# Patient Record
Sex: MALE | Race: WHITE | Employment: FULL TIME | URBAN - METROPOLITAN AREA
[De-identification: names, ages, dates, MRNs, and addresses within clinical notes are randomized per-mention and may not be internally consistent; named-entity substitution may affect disease eponyms.]

---

## 2017-08-02 ENCOUNTER — OFFICE VISIT (OUTPATIENT)
Dept: INTERNAL MEDICINE CLINIC | Age: 57
End: 2017-08-02

## 2017-08-02 VITALS
HEIGHT: 72 IN | SYSTOLIC BLOOD PRESSURE: 126 MMHG | WEIGHT: 266.4 LBS | DIASTOLIC BLOOD PRESSURE: 80 MMHG | BODY MASS INDEX: 36.08 KG/M2 | HEART RATE: 80 BPM

## 2017-08-02 DIAGNOSIS — M54.2 PAIN OF CERVICAL SPINE: ICD-10-CM
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
## 2017-08-02 PROBLEM — S16.1XXA STRAIN OF NECK MUSCLE: Status: ACTIVE | Noted: 2017-08-02

## 2017-08-02 PROBLEM — I10 ESSENTIAL HYPERTENSION: Status: ACTIVE | Noted: 2017-08-02

## 2017-08-02 PROBLEM — S53.402A SPRAIN OF LEFT ELBOW: Status: ACTIVE | Noted: 2017-08-02

## 2017-08-02 PROBLEM — S70.11XA CONTUSION OF RIGHT THIGH: Status: ACTIVE | Noted: 2017-08-02

## 2017-08-02 PROBLEM — S43.409A SHOULDER SPRAIN: Status: ACTIVE | Noted: 2017-08-02

## 2017-08-02 PROBLEM — S39.012A BACK STRAIN: Status: ACTIVE | Noted: 2017-08-02

## 2017-08-02 PROBLEM — R91.1 PULMONARY NODULE: Status: ACTIVE | Noted: 2017-08-02
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
## 2017-08-02 NOTE — PATIENT INSTRUCTIONS
Neck Pain: Care Instructions  Your Care Instructions  You can have neck pain anywhere from the bottom of your head to the top of your shoulders. It can spread to the upper back or arms. Injuries, painting a ceiling, sleeping with your neck twisted, staying in one position for too long, and many other activities can cause neck pain. Most neck pain gets better with home care. Your doctor may recommend medicine to relieve pain or relax your muscles. He or she may suggest exercise and physical therapy to increase flexibility and relieve stress. You may need to wear a special (cervical) collar to support your neck for a day or two. Follow-up care is a key part of your treatment and safety. Be sure to make and go to all appointments, and call your doctor if you are having problems. It's also a good idea to know your test results and keep a list of the medicines you take. How can you care for yourself at home? · Try using a heating pad on a low or medium setting for 15 to 20 minutes every 2 or 3 hours. Try a warm shower in place of one session with the heating pad. · You can also try an ice pack for 10 to 15 minutes every 2 to 3 hours. Put a thin cloth between the ice and your skin. · Take pain medicines exactly as directed. ¨ If the doctor gave you a prescription medicine for pain, take it as prescribed. ¨ If you are not taking a prescription pain medicine, ask your doctor if you can take an over-the-counter medicine. · If your doctor recommends a cervical collar, wear it exactly as directed. When should you call for help? Call your doctor now or seek immediate medical care if:  · You have new or worsening numbness in your arms, buttocks or legs. · You have new or worsening weakness in your arms or legs. (This could make it hard to stand up.)  · You lose control of your bladder or bowels.   Watch closely for changes in your health, and be sure to contact your doctor if:  · Your neck pain is getting worse.  · You are not getting better after 1 week. · You do not get better as expected. Where can you learn more? Go to http://aldo-beau.info/. Enter [id number] in the search box to learn more about \"Neck Pain: Care Instructions. \"  Current as of: March 21, 2017  Content Version: 11.3  © 9620-0425 [employer]. Care instructions adapted under license by [employer] (which disclaims liability or warranty for this information). If you have questions about a medical condition or this instruction, always ask your healthcare professional. [place] [address] any warranty or liability for your use of this information.

## 2017-08-02 NOTE — MR AVS SNAPSHOT
Visit Information Date & Time Provider Department Dept. Phone Encounter #  
 8/2/2017  4:00 PM Verner Auerbach, MD [facility] [id number] Upcoming Health Maintenance Date Due Hepatitis C Screening 1960 DTaP/Tdap/Td series (1 - Tdap) 5/4/1981 FOBT Q 1 YEAR AGE 50-75 5/4/2010 INFLUENZA AGE 9 TO ADULT 8/1/2017 Allergies as of 8/2/2017  Review Complete On: 8/2/2017 By: Verner Auerbach, MD  
 No Known Allergies Current Immunizations  Never Reviewed No immunizations on file. Not reviewed this visit You Were Diagnosed With   
  
 Codes Comments Pain of cervical spine     ICD-10-CM: M54.2 ICD-9-CM: 723.1 Vitals BP Pulse Height(growth percentile) Weight(growth percentile) BMI Smoking Status 126/80 80 6' (1.829 m) 266 lb 6.4 oz (120.8 kg) 36.13 kg/m2 Never Smoker Vitals History BMI and BSA Data Body Mass Index Body Surface Area  
 36.13 kg/m 2 2.48 m 2 Your Updated Medication List  
  
   
This list is accurate as of: 8/2/17  4:25 PM.  Always use your most recent med list.  
  
  
  
  
 LISINOPRIL-HYDROCHLOROTHIAZIDE PO Take  by mouth daily. To-Do List   
 08/03/2017 Imaging:  MRI CERV SPINE W WO CONT Introducing \[facility] [place] [facility]\"" SERVICES! Nica Handy introduces [employer] patient portal. Now you can access parts of your medical record, email your doctor's office, and request medication refills online. 1. In your internet browser, go to [email or web address] 2. Click on the First Time User? Click Here link in the Sign In box. You will see the New Member Sign Up page. 3. Enter your [employer] Access Code exactly as it appears below. You will not need to use this code after youve completed the sign-up process. If you do not sign up before the expiration date, you must request a new code. · [employer] Access Code: [email or web address] Expires: 10/31/2017  3:50 PM 
 
4. Enter the last four digits of your Social Security Number (xxxx) and Date of Birth (mm/dd/yyyy) as indicated and click Submit. You will be taken to the next sign-up page. 5. Create a DataProm ID. This will be your [employer] login ID and cannot be changed, so think of one that is secure and easy to remember. 6. Create a DataProm password. You can change your password at any time. 7. Enter your Password Reset Question and Answer. This can be used at a later time if you forget your password. 8. Enter your e-mail address. You will receive e-mail notification when new information is available in [address] 9. Click Sign Up. You can now view and download portions of your medical record. 10. Click the Download Summary menu link to download a portable copy of your medical information. If you have questions, please visit the Frequently Asked Questions section of the Data[employer] website. Remember, [employer] is NOT to be used for urgent needs. For medical emergencies, dial 911. Now available from your iPhone and Android! Please provide this summary of care documentation to your next provider. Your primary care clinician is listed as ANA Maldonado 21. If you have any questions after today's visit, please call [phone].
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
## 2017-08-08 ENCOUNTER — HOSPITAL ENCOUNTER (OUTPATIENT)
Dept: MRI IMAGING | Age: 57
Discharge: HOME | End: 2017-08-08
Attending: INTERNAL MEDICINE
Payer: COMMERCIAL

## 2017-08-08 DIAGNOSIS — M54.2 PAIN OF CERVICAL SPINE: ICD-10-CM

## 2017-08-08 PROCEDURE — 72141 MRI NECK SPINE W/O DYE: CPT

## 2017-08-09 NOTE — PROGRESS NOTES
Patient notified of results and advised to see Dr. Radha Rader Pt reports onset of chest pain in the center of his chest that radiates into neck, pt reports he has been taking nitro, which initially relieved the pain, and is now not helping the pain, Pt reports \"burning\" in his throat, SOB, and nausea

## 2017-11-21 ENCOUNTER — OFFICE VISIT (OUTPATIENT)
Dept: INTERNAL MEDICINE CLINIC | Age: 57
End: 2017-11-21

## 2017-11-21 VITALS
SYSTOLIC BLOOD PRESSURE: 120 MMHG | BODY MASS INDEX: 33.5 KG/M2 | WEIGHT: 261 LBS | DIASTOLIC BLOOD PRESSURE: 82 MMHG | HEART RATE: 78 BPM | OXYGEN SATURATION: 95 % | HEIGHT: 74 IN

## 2017-11-21 DIAGNOSIS — L30.9 DERMATITIS: Primary | ICD-10-CM

## 2017-11-21 NOTE — PROGRESS NOTES
Jermaine Hernandez is a 62 y.o. male presenting for Rash (? ringwrom)  . 1. Have you been to the ER, urgent care clinic since your last visit? Hospitalized since your last visit? No    2. Have you seen or consulted any other health care providers outside of the [address] [place] [address] since your last visit? Include any pap smears or colon screening. No    No flowsheet data found. No flowsheet data found. PHQ over the last two weeks 8/2/2017   Little interest or pleasure in doing things Not at all   Feeling down, depressed or hopeless Not at all   Total Score PHQ 2 0       There are no discontinued medications.

## 2017-11-21 NOTE — MR AVS SNAPSHOT
Visit Information Date & Time Provider Department Dept. Phone Encounter #  
 11/21/2017  8:50 AM Cale Harris MD [facility] [id number] Upcoming Health Maintenance Date Due Hepatitis C Screening 1960 DTaP/Tdap/Td series (1 - Tdap) 5/4/1981 FOBT Q 1 YEAR AGE 50-75 5/4/2010 Influenza Age 5 to Adult 8/1/2017 Allergies as of 11/21/2017  Review Complete On: 11/21/2017 By: Cale Harris MD  
 No Known Allergies Current Immunizations  Never Reviewed No immunizations on file. Not reviewed this visit You Were Diagnosed With   
  
 Codes Comments Dermatitis    -  Primary ICD-10-CM: L30.9 ICD-9-CM: 692.9 Vitals BP Pulse Height(growth percentile) Weight(growth percentile) SpO2 BMI  
 120/82 (BP 1 Location: Left arm, BP Patient Position: Sitting) 78 6' 2\" (1.88 m) 261 lb (118.4 kg) 95% 33.51 kg/m2 Smoking Status Never Smoker BMI and BSA Data Body Mass Index Body Surface Area  
 33.51 kg/m 2 2.49 m 2 Your Updated Medication List  
  
   
This list is accurate as of: 11/21/17  9:00 AM.  Always use your most recent med list.  
  
  
  
  
 cranberry extract 450 mg Tab tablet Take 450 mg by mouth. LISINOPRIL-HYDROCHLOROTHIAZIDE PO Take  by mouth daily. We Performed the Following REFERRAL TO DERMATOLOGY [REF19 Custom] Comments:  
 appt asap Evaluate leg rash with multiple bilateral ring lesions Referral Information Referral ID Referred By Referred To  
  
 [id number] Willie Hernadez MD   
   [address] [facility] [place] [facility] Phone: [phone] Fax: [phone] Visits Status Start Date End Date 1 New Request 11/21/17 11/21/18 If your referral has a status of pending review or denied, additional information will be sent to support the outcome of this decision. Introducing [facility]! Ashok Vanegas introduces [employer] patient portal. Now you can access parts of your medical record, email your doctor's office, and request medication refills online. 1. In your internet browser, go to [email or web address] 2. Click on the First Time User? Click Here link in the Sign In box. You will see the New Member Sign Up page. 3. Enter your [employer] Access Code exactly as it appears below. You will not need to use this code after youve completed the sign-up process. If you do not sign up before the expiration date, you must request a new code. · [employer] Access Code: [email or web address] Expires: 2/19/2018  8:37 AM 
 
4. Enter the last four digits of your Social Security Number (xxxx) and Date of Birth (mm/dd/yyyy) as indicated and click Submit. You will be taken to the next sign-up page. 5. Create a [employer] ID. This will be your [employer] login ID and cannot be changed, so think of one that is secure and easy to remember. 6. Create a [employer] password. You can change your password at any time. 7. Enter your Password Reset Question and Answer. This can be used at a later time if you forget your password. 8. Enter your e-mail address. You will receive e-mail notification when new information is available in [address] 9. Click Sign Up. You can now view and download portions of your medical record. 10. Click the Download Summary menu link to download a portable copy of your medical information. If you have questions, please visit the Frequently Asked Questions section of the [employer] website. Remember, [employer] is NOT to be used for urgent needs. For medical emergencies, dial 911. Now available from your iPhone and Android! Please provide this summary of care documentation to your next provider. Your primary care clinician is listed as ANA Dubois. If you have any questions after today's visit, please call [phone].

## 2017-11-21 NOTE — PATIENT INSTRUCTIONS
Dermatitis: Care Instructions  Your Care Instructions  Dermatitis is the general name used for any rash or inflammation of the skin. Different kinds of dermatitis cause different kinds of rashes. Common causes of a rash include new medicines, plants (such as poison oak or poison ivy), heat, and stress. Certain illnesses can also cause a rash. An allergic reaction to something that touches your skin, such as latex, nickel, or poison ivy, is called contact dermatitis. Contact dermatitis may also be caused by something that irritates the skin, such as bleach, a chemical, or soap. These types of rashes cannot be spread from person to person. How long your rash will last depends on what caused it. Rashes may last a few days or months. Follow-up care is a key part of your treatment and safety. Be sure to make and go to all appointments, and call your doctor if you are having problems. It's also a good idea to know your test results and keep a list of the medicines you take. How can you care for yourself at home? · Do not scratch the rash. Cut your nails short, and file them smooth. Or wear gloves if this helps keep you from scratching. · Wash the area with water only. Pat dry. · Put cold, wet cloths on the rash to reduce itching. · Keep cool, and stay out of the sun. · Leave the rash open to the air as much as possible. · If the rash itches, use hydrocortisone cream. Follow the directions on the label. Calamine lotion may help for plant rashes. · Take an over-the-counter antihistamine, such as diphenhydramine (Benadryl) or loratadine (Claritin), to help calm the itching. Read and follow all instructions on the label. · If your doctor prescribed a cream, use it as directed. If your doctor prescribed medicine, take it exactly as directed. When should you call for help?   Call your doctor now or seek immediate medical care if:  ? · You have symptoms of infection, such as:  ¨ Increased pain, swelling, warmth, or redness. ¨ Red streaks leading from the area. ¨ Pus draining from the area. ¨ A fever. ? · You have joint pain along with the rash. ? Watch closely for changes in your health, and be sure to contact your doctor if:  ? · Your rash is changing or getting worse. ? · You are not getting better as expected. Where can you learn more? Go to http://aldo-beau.info/. Enter [phone] in the search box to learn more about \"Dermatitis: Care Instructions. \"  Current as of: October 13, 2016  Content Version: 11.4  © 8161-8763 Appistry. Care instructions adapted under license by [employer] (which disclaims liability or warranty for this information). If you have questions about a medical condition or this instruction, always ask your healthcare professional. [address] any warranty or liability for your use of this information.

## 2018-01-04 ENCOUNTER — OFFICE VISIT (OUTPATIENT)
Dept: INTERNAL MEDICINE CLINIC | Age: 58
End: 2018-01-04

## 2018-01-04 VITALS
HEIGHT: 74 IN | TEMPERATURE: 99.3 F | WEIGHT: 260 LBS | DIASTOLIC BLOOD PRESSURE: 80 MMHG | BODY MASS INDEX: 33.37 KG/M2 | SYSTOLIC BLOOD PRESSURE: 126 MMHG

## 2018-01-04 DIAGNOSIS — J11.1 INFLUENZA: Primary | ICD-10-CM

## 2018-01-04 RX ORDER — CODEINE PHOSPHATE AND GUAIFENESIN 10; 100 MG/5ML; MG/5ML
5 SOLUTION ORAL
Qty: 118 ML | Refills: 0 | Status: SHIPPED | OUTPATIENT
Start: 2018-01-04 | End: 2019-01-08 | Stop reason: ALTCHOICE

## 2018-01-04 RX ORDER — OSELTAMIVIR PHOSPHATE 75 MG/1
75 CAPSULE ORAL 2 TIMES DAILY
Qty: 10 CAP | Refills: 0 | Status: SHIPPED | OUTPATIENT
Start: 2018-01-04 | End: 2018-01-09

## 2018-01-04 NOTE — PATIENT INSTRUCTIONS

## 2018-01-04 NOTE — PROGRESS NOTES
Ekle Cason is a 62 y.o. male presenting for Cough  . 1. Have you been to the ER, urgent care clinic since your last visit? Hospitalized since your last visit? No    2. Have you seen or consulted any other health care providers outside of the [address] [place] [address] since your last visit? Include any pap smears or colon screening. No    No flowsheet data found. No flowsheet data found. PHQ over the last two weeks 8/2/2017   Little interest or pleasure in doing things Not at all   Feeling down, depressed or hopeless Not at all   Total Score PHQ 2 0       There are no discontinued medications.

## 2018-01-04 NOTE — MR AVS SNAPSHOT
Visit Information Date & Time Provider Department Dept. Phone Encounter #  
 1/4/2018  8:50 AM Kavitha Giron MD [facility] [id number] Follow-up Instructions Return if symptoms worsen or fail to improve. Upcoming Health Maintenance Date Due Hepatitis C Screening 1960 DTaP/Tdap/Td series (1 - Tdap) 5/4/1981 FOBT Q 1 YEAR AGE 50-75 5/4/2010 Allergies as of 1/4/2018  Review Complete On: 1/4/2018 By: Kavitha Giron MD  
 No Known Allergies Current Immunizations  Never Reviewed No immunizations on file. Not reviewed this visit You Were Diagnosed With   
  
 Codes Comments Influenza    -  Primary ICD-10-CM: J11.1 ICD-9-CM: 487. 1 Vitals BP Temp Height(growth percentile) Weight(growth percentile) BMI Smoking Status 126/80 (BP 1 Location: Left arm) 99.3 °F (37.4 °C) (Oral) 6' 2\" (1.88 m) 260 lb (117.9 kg) 33.38 kg/m2 Never Smoker Vitals History BMI and BSA Data Body Mass Index Body Surface Area  
 33.38 kg/m 2 2.48 m 2 Preferred Pharmacy Pharmacy Name Phone [facility] [phone] Your Updated Medication List  
  
   
This list is accurate as of: 1/4/18  9:05 AM.  Always use your most recent med list.  
  
  
  
  
 cranberry extract 450 mg Tab tablet Take 450 mg by mouth.  
  
 guaiFENesin-codeine 100-10 mg/5 mL solution Commonly known as:  Eden Cordon Take 5 mL by mouth four (4) times daily as needed for Cough. Max Daily Amount: 20 mL. LISINOPRIL-HYDROCHLOROTHIAZIDE PO Take  by mouth daily. oseltamivir 75 mg capsule Commonly known as:  TAMIFLU Take 1 Cap by mouth two (2) times a day for 5 days. Prescriptions Printed  Refills  
 guaiFENesin-codeine (CHERATUSSIN AC) 100-10 mg/5 mL solution 0  
 Sig: Take 5 mL by mouth four (4) times daily as needed for Cough. Max Daily Amount: 20 mL. Class: Print Route: Oral  
  
Prescriptions Sent to Pharmacy Refills  
 oseltamivir (TAMIFLU) 75 mg capsule 0 Sig: Take 1 Cap by mouth two (2) times a day for 5 days. Class: Normal  
 Pharmacy: [facility]  #: [phone] Route: Oral  
  
Follow-up Instructions Return if symptoms worsen or fail to improve. Introducing [address]! Daniel Little introduces [employer] patient portal. Now you can access parts of your medical record, email your doctor's office, and request medication refills online. 1. In your internet browser, go to [email or web address] 2. Click on the First Time User? Click Here link in the Sign In box. You will see the New Member Sign Up page. 3. Enter your [employer] Access Code exactly as it appears below. You will not need to use this code after youve completed the sign-up process. If you do not sign up before the expiration date, you must request a new code. · [employer] Access Code: [email or web address] Expires: 2/19/2018  8:37 AM 
 
4. Enter the last four digits of your Social Security Number (xxxx) and Date of Birth (mm/dd/yyyy) as indicated and click Submit. You will be taken to the next sign-up page. 5. Create a [employer] ID. This will be your [employer] login ID and cannot be changed, so think of one that is secure and easy to remember. 6. Create a [employer] password. You can change your password at any time. 7. Enter your Password Reset Question and Answer. This can be used at a later time if you forget your password. 8. Enter your e-mail address. You will receive e-mail notification when new information is available in [address] 9. Click Sign Up. You can now view and download portions of your medical record.  
10. Click the Download Summary menu link to download a portable copy of your medical information. If you have questions, please visit the Frequently Asked Questions section of the [employer] website. Remember, [employer] is NOT to be used for urgent needs. For medical emergencies, dial 911. Now available from your iPhone and Android! Please provide this summary of care documentation to your next provider. Your primary care clinician is listed as ANA Dubois. If you have any questions after today's visit, please call [phone].

## 2018-01-04 NOTE — LETTER
NOTIFICATION RETURN TO WORK / SCHOOL 
 
1/4/2018 9:05 AM 
 
Mr. Von Guthrie [address] [facility] [id number] To Whom It May Concern: 
 
Von Guthrie is currently under the care of Lisa Valera. He will return to work/school on: 1-8-18. If there are questions or concerns please have the patient contact our office. Sincerely, Ray Castro MD
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
## 2018-03-09 RX ORDER — LISINOPRIL AND HYDROCHLOROTHIAZIDE 12.5; 2 MG/1; MG/1
TABLET ORAL
Qty: 30 TAB | Refills: 6 | Status: SHIPPED | OUTPATIENT
Start: 2018-03-09 | End: 2018-12-10 | Stop reason: SDUPTHER

## 2018-12-10 RX ORDER — LISINOPRIL AND HYDROCHLOROTHIAZIDE 12.5; 2 MG/1; MG/1
TABLET ORAL
Qty: 30 TAB | Refills: 0 | Status: SHIPPED | OUTPATIENT
Start: 2018-12-10 | End: 2019-01-08 | Stop reason: SDUPTHER

## 2018-12-10 NOTE — TELEPHONE ENCOUNTER
Pharmacy on file verified  Requested Prescriptions     Pending Prescriptions Disp Refills    lisinopril-hydroCHLOROthiazide (PRINZIDE, ZESTORETIC) 20-12.5 mg per tablet 30 Tab 0     Sig: TAKE 1 TABLET BY MOUTH EVERY MORNING       LOV 1/4/2018  NOV 1/8/19

## 2019-01-08 ENCOUNTER — OFFICE VISIT (OUTPATIENT)
Dept: INTERNAL MEDICINE CLINIC | Age: 59
End: 2019-01-08

## 2019-01-08 VITALS
HEIGHT: 74 IN | HEART RATE: 104 BPM | SYSTOLIC BLOOD PRESSURE: 108 MMHG | BODY MASS INDEX: 33.5 KG/M2 | WEIGHT: 261 LBS | DIASTOLIC BLOOD PRESSURE: 72 MMHG | OXYGEN SATURATION: 95 %

## 2019-01-08 DIAGNOSIS — I10 ESSENTIAL HYPERTENSION: Primary | Chronic | ICD-10-CM

## 2019-01-08 DIAGNOSIS — N20.0 KIDNEY STONES: ICD-10-CM

## 2019-01-08 LAB
BACTERIA UA POCT, BACTPOCT: NORMAL
BILIRUB UR QL STRIP: NEGATIVE
CASTS UA POCT: NORMAL
CLUE CELLS, CLUEPOCT: NEGATIVE
CRYSTALS UA POCT, CRYSPOCT: NEGATIVE
EPITHELIAL CELLS POCT: NEGATIVE
GLUCOSE UR-MCNC: NEGATIVE MG/DL
GRAN# POC: 7 K/UL (ref 2–7.8)
GRAN% POC: 72.3 % (ref 37–92)
HCT VFR BLD CALC: 46.4 % (ref 37–51)
HGB BLD-MCNC: 15.5 G/DL (ref 12–18)
KETONES P FAST UR STRIP-MCNC: NORMAL MG/DL
LY# POC: 2.3 K/UL (ref 0.6–4.1)
LY% POC: 24 % (ref 10–58.5)
MCH RBC QN: 28.4 PG (ref 26–32)
MCHC RBC-ENTMCNC: 33.4 G/DL (ref 30–36)
MCV RBC: 85 FL (ref 80–97)
MID #, POC: 0.3 K/UL (ref 0–1.8)
MID% POC: 3.7 % (ref 0.1–24)
MUCUS UA POCT, MUCPOCT: NORMAL
PH UR STRIP: 6 [PH] (ref 5–7)
PLATELET # BLD: 249 K/UL (ref 140–440)
PROT UR QL STRIP: NORMAL
RBC # BLD: 5.47 M/UL (ref 4.2–6.3)
RBC UA POCT, RBCPOCT: 0
SP GR UR STRIP: 1.01 (ref 1.01–1.02)
TRICH UA POCT, TRICHPOC: NEGATIVE
UA UROBILINOGEN AMB POC: NORMAL (ref 0.2–1)
URINALYSIS CLARITY POC: CLEAR
URINALYSIS COLOR POC: YELLOW
URINE BLOOD POC: NEGATIVE
URINE CULT COMMENT, POCT: NORMAL
URINE LEUKOCYTES POC: NEGATIVE
URINE NITRITES POC: NEGATIVE
WBC # BLD: 9.6 K/UL (ref 4.1–10.9)
WBC UA POCT, WBCPOCT: 0
YEAST UA POCT, YEASTPOC: NEGATIVE

## 2019-01-08 RX ORDER — ALLOPURINOL 100 MG/1
TABLET ORAL DAILY
COMMUNITY

## 2019-01-08 RX ORDER — POTASSIUM CITRATE 10 MEQ/1
10 TABLET, EXTENDED RELEASE ORAL 2 TIMES DAILY
COMMUNITY

## 2019-01-08 RX ORDER — LISINOPRIL AND HYDROCHLOROTHIAZIDE 12.5; 2 MG/1; MG/1
TABLET ORAL
Qty: 30 TAB | Refills: 12 | Status: SHIPPED | OUTPATIENT
Start: 2019-01-08 | End: 2019-09-04 | Stop reason: SDUPTHER

## 2019-01-08 RX ORDER — TAMSULOSIN HYDROCHLORIDE 0.4 MG/1
0.4 CAPSULE ORAL DAILY
COMMUNITY
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
## 2019-01-08 NOTE — PROGRESS NOTES
Stephany Kennedy is a 62 y.o. male presenting for Hypertension (1 year fu) Elijah Milner 1. Have you been to the ER, urgent care clinic since your last visit? Hospitalized since your last visit? No 
 
2. Have you seen or consulted any other health care providers outside of the [address] [place] [address] since your last visit? Include any pap smears or colon screening. No 
 
No flowsheet data found. No flowsheet data found. PHQ over the last two weeks 1/8/2019 Little interest or pleasure in doing things Not at all Feeling down, depressed, irritable, or hopeless Not at all Total Score PHQ 2 0 Medications Discontinued During This Encounter Medication Reason  LISINOPRIL-HYDROCHLOROTHIAZIDE PO Duplicate Order

## 2019-01-08 NOTE — PATIENT INSTRUCTIONS

## 2019-01-09 LAB
ALBUMIN SERPL-MCNC: 4.3 G/DL (ref 3.5–5.5)
ALBUMIN/GLOB SERPL: 1.7 {RATIO} (ref 1.2–2.2)
ALP SERPL-CCNC: 83 IU/L (ref 39–117)
ALT SERPL-CCNC: 25 IU/L (ref 0–44)
AST SERPL-CCNC: 22 IU/L (ref 0–40)
BILIRUB SERPL-MCNC: 0.2 MG/DL (ref 0–1.2)
BUN SERPL-MCNC: 20 MG/DL (ref 6–24)
BUN/CREAT SERPL: 19 (ref 9–20)
CALCIUM SERPL-MCNC: 9.1 MG/DL (ref 8.7–10.2)
CHLORIDE SERPL-SCNC: 103 MMOL/L (ref 96–106)
CHOLEST SERPL-MCNC: 169 MG/DL (ref 100–199)
CO2 SERPL-SCNC: 23 MMOL/L (ref 20–29)
CREAT SERPL-MCNC: 1.03 MG/DL (ref 0.76–1.27)
GLOBULIN SER CALC-MCNC: 2.5 G/DL (ref 1.5–4.5)
GLUCOSE SERPL-MCNC: 89 MG/DL (ref 65–99)
HDLC SERPL-MCNC: 30 MG/DL
LDLC SERPL CALC-MCNC: 102 MG/DL (ref 0–99)
POTASSIUM SERPL-SCNC: 4.4 MMOL/L (ref 3.5–5.2)
PROT SERPL-MCNC: 6.8 G/DL (ref 6–8.5)
SODIUM SERPL-SCNC: 143 MMOL/L (ref 134–144)
TRIGL SERPL-MCNC: 186 MG/DL (ref 0–149)
URATE SERPL-MCNC: 4 MG/DL (ref 3.7–8.6)
VLDLC SERPL CALC-MCNC: 37 MG/DL (ref 5–40)

## 2019-05-28 ENCOUNTER — TELEPHONE (OUTPATIENT)
Dept: INTERNAL MEDICINE CLINIC | Age: 59
End: 2019-05-28

## 2019-05-28 NOTE — TELEPHONE ENCOUNTER
Patient is calling, he advises that he is having back pain in his rib cage area on the right side again. He has had kidney stones in the past so he does not know if this is one again or if he has pulled something but would like to have Dr. Malcom Oliver look at it. He is requesting an appointment Thursday, any time 2:30 and after.     Best call back # for patient: [phone]

## 2019-05-30 ENCOUNTER — OFFICE VISIT (OUTPATIENT)
Dept: INTERNAL MEDICINE CLINIC | Age: 59
End: 2019-05-30

## 2019-05-30 VITALS
HEART RATE: 73 BPM | HEIGHT: 74 IN | BODY MASS INDEX: 33.93 KG/M2 | SYSTOLIC BLOOD PRESSURE: 122 MMHG | DIASTOLIC BLOOD PRESSURE: 80 MMHG | WEIGHT: 264.4 LBS | OXYGEN SATURATION: 94 % | TEMPERATURE: 98 F

## 2019-05-30 DIAGNOSIS — N39.0 URINARY TRACT INFECTION WITHOUT HEMATURIA, SITE UNSPECIFIED: ICD-10-CM

## 2019-05-30 DIAGNOSIS — R10.9 RIGHT FLANK PAIN: Primary | ICD-10-CM

## 2019-05-30 LAB
BACTERIA,BACTU: ABNORMAL
BILIRUB UR QL: NEGATIVE
CLARITY: CLEAR
COLOR UR: ABNORMAL
GLUCOSE 24H UR-MRATE: NEGATIVE G/(24.H)
HGB UR QL STRIP: ABNORMAL
KETONES UR QL STRIP.AUTO: NEGATIVE
LEUKOCYTE ESTERASE: NEGATIVE
NITRITE UR QL STRIP.AUTO: NEGATIVE
PH UR STRIP: 6 [PH] (ref 5–7)
PROT UR STRIP-MCNC: NEGATIVE MG/DL
RBC #/AREA URNS HPF: ABNORMAL #/HPF
SP GR UR REFRACTOMETRY: 1.02 (ref 1–1.03)
UROBILINOGEN UR QL STRIP.AUTO: NEGATIVE
WBC URNS QL MICRO: 0 #/HPF

## 2019-05-30 RX ORDER — DICLOFENAC SODIUM 75 MG/1
75 TABLET, DELAYED RELEASE ORAL 2 TIMES DAILY
Qty: 60 TAB | Refills: 0 | Status: SHIPPED | OUTPATIENT
Start: 2019-05-30 | End: 2019-10-08 | Stop reason: ALTCHOICE

## 2019-05-30 NOTE — PROGRESS NOTES
Prachi Cha is a 61 y.o. male presenting for Back Pain  . 1. Have you been to the ER, urgent care clinic since your last visit? Hospitalized since your last visit? No    2. Have you seen or consulted any other health care providers outside of the [address] [place] [address] since your last visit? Include any pap smears or colon screening. No    No flowsheet data found. No flowsheet data found. 3 most recent PHQ Screens 1/8/2019   Little interest or pleasure in doing things Not at all   Feeling down, depressed, irritable, or hopeless Not at all   Total Score PHQ 2 0       There are no discontinued medications.
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
## 2019-06-01 LAB — BACTERIA UR CULT: NO GROWTH

## 2019-09-04 DIAGNOSIS — I10 ESSENTIAL HYPERTENSION: Chronic | ICD-10-CM

## 2019-09-05 RX ORDER — LISINOPRIL AND HYDROCHLOROTHIAZIDE 12.5; 2 MG/1; MG/1
TABLET ORAL
Qty: 30 TAB | Refills: 0 | Status: SHIPPED | OUTPATIENT
Start: 2019-09-05 | End: 2019-11-08 | Stop reason: SDUPTHER

## 2019-10-08 ENCOUNTER — OFFICE VISIT (OUTPATIENT)
Dept: INTERNAL MEDICINE CLINIC | Age: 59
End: 2019-10-08

## 2019-10-08 VITALS
BODY MASS INDEX: 34.06 KG/M2 | HEART RATE: 80 BPM | HEIGHT: 74 IN | RESPIRATION RATE: 24 BRPM | TEMPERATURE: 98 F | OXYGEN SATURATION: 93 % | WEIGHT: 265.4 LBS | SYSTOLIC BLOOD PRESSURE: 118 MMHG | DIASTOLIC BLOOD PRESSURE: 80 MMHG

## 2019-10-08 DIAGNOSIS — M77.42 METATARSALGIA OF BOTH FEET: Primary | ICD-10-CM

## 2019-10-08 DIAGNOSIS — M77.41 METATARSALGIA OF BOTH FEET: Primary | ICD-10-CM

## 2019-10-08 NOTE — PATIENT INSTRUCTIONS
Metatarsalgia: Care Instructions Your Care Instructions Metatarsalgia (say \"met-uh-tar-SAL-augie-uh\") is pain in the ball of the foot. It sometimes spreads to the toes. The ball of the foot is the bottom of the foot, where the toes join the foot. While walking might be very painful, the pain is usually not a sign of a serious or permanent problem. But any pain can affect your life, so it is important that you treat it. Pain in this area can be caused by many things. For example, you may have this pain if you stand or walk a lot or wear tight shoes or high heels. Your pain might be caused by inflammation of a joint (capsulitis). It is most common in the joint at the base of the second toe, near the ball of the foot. Capsulitis happens when ligaments that go around the joint become inflamed. The joint may be swollen. It may feel like there is a small rock under it. You may have had an X-ray if your doctor wanted to make sure a more serious problem is not causing your pain. Treatment may consist of home care, such as rest, wearing different shoes, and taking over-the-counter pain medicines. It can take months for the pain to go away. If the ligaments around a joint are torn, or if a toe has started to slant toward the toe next to it, you may need surgery. Follow-up care is a key part of your treatment and safety. Be sure to make and go to all appointments, and call your doctor if you are having problems. It's also a good idea to know your test results and keep a list of the medicines you take. How can you care for yourself at home? · Rest your foot. If an activity is causing the pain, find another one to do that does not put so much pressure on your foot. · Put ice or a cold pack on your foot when it hurts or after you've done something that usually causes pain. Do this for 10 to 20 minutes at a time. Put a thin cloth between the ice and your skin. · Take an over-the-counter pain medicine, such as acetaminophen (Tylenol), ibuprofen (Advil, Motrin), or naproxen (Aleve). Be safe with medicines. Read and follow all instructions on the label. · Do not take two or more pain medicines at the same time unless the doctor told you to. Many pain medicines have acetaminophen, which is Tylenol. Too much acetaminophen (Tylenol) can be harmful. · Wear roomy, comfortable shoes. · If your doctor recommends it, use special pads to relieve the pressure on your foot. The pads may fit into your shoes, or they may stick to the soles of your feet. · Ask your doctor about using orthotic shoe devices. These are molded pieces of rubber, leather, metal, plastic, or other synthetic material that are inserted into a shoe. · Wear shoes with good arch support. · Try not to wear high heels or narrow shoes. · Follow your doctor's or physical therapist's directions for exercise. When should you call for help? Watch closely for changes in your health, and be sure to contact your doctor if: 
  · You have new or worse symptoms.  
  · You do not get better as expected. Where can you learn more? Go to http://aldo-beau.info/. Enter D309 in the search box to learn more about \"Metatarsalgia: Care Instructions. \" Current as of: June 26, 2019 Content Version: 12.2 © 5735-4783 [employer]. Care instructions adapted under license by [employer] (which disclaims liability or warranty for this information). If you have questions about a medical condition or this instruction, always ask your healthcare professional. [address] any warranty or liability for your use of this information. Learning About Cutting Calories How do calories affect your weight? Food gives your body energy. Energy from the food you eat is measured in calories.  This energy keeps your heart beating, your brain active, and your muscles working. Your body needs a certain number of calories each day. After your body uses the calories it needs, it stores extra calories as fat. To lose weight safely, you have to eat fewer calories while eating in a healthy way. How many calories do you need each day? The more active you are, the more calories you need. When you are less active, you need fewer calories. How many calories you need each day also depends on several things, including your age and whether you are male or female. Here are some general guidelines for adults: 
· Less active women and older adults need 1,600 to 2,000 calories each day. · Active women and less active men need 2,000 to 2,400 calories each day. · Active men need 2,400 to 3,000 calories each day. How can you cut calories and eat healthy meals? Whole grains, vegetables and fruits, and dried beans are good lower-calorie foods. They give you lots of nutrients and fiber. And they fill you up. Sweets, energy drinks, and soda pop are high in calories. They give you few nutrients and no fiber. Try to limit soda pop, fruit juice, and energy drinks. Drink water instead. Some fats can be part of a healthy diet. But cutting back on fats from highly processed foods like fast foods and many snack foods is a good way to lower the calories in your diet. Also, use smaller amounts of fats like butter, margarine, salad dressing, and mayonnaise. Add fresh garlic, lemon, or herbs to your meals to add flavor without adding fat. Meats and dairy products can be a big source of hidden fats. Try to choose lean or low-fat versions of these products. Fat-free cookies, candies, chips, and frozen treats can still be high in sugar and calories. Some fat-free foods have more calories than regular ones. Eat fat-free treats in moderation, as you would other foods.  
If your favorite foods are high in fat, salt, sugar, or calories, limit how often you eat them. Eat smaller servings, or look for healthy substitutes. Fill up on fruits, vegetables, and whole grains. Eating at home · Use meat as a side dish instead of as the main part of your meal. 
· Try main dishes that use whole wheat pasta, brown rice, dried beans, or vegetables. · Find ways to cook with little or no fat, such as broiling, steaming, or grilling. · Use cooking spray instead of oil. If you use oil, use a monounsaturated oil, such as canola or olive oil. · Trim fat from meats before you cook them. · Drain off fat after you brown the meat or while you roast it. · Chill soups and stews after you cook them. Then skim the fat off the top after it hardens. Eating out · Order foods that are broiled or poached rather than fried or breaded. · Cut back on the amount of butter or margarine that you use on bread. · Order sauces, gravies, and salad dressings on the side, and use only a little. · When you order pasta, choose tomato sauce rather than cream sauce. · Ask for salsa with your baked potato instead of sour cream, butter, cheese, or spangler. · Order meals in a small size instead of upgrading to a large. · Share an entree, or take part of your food home to eat as another meal. 
· Share appetizers and desserts. Where can you learn more? Go to http://aldo-beau.info/. Enter 99 [id number] in the search box to learn more about \"Learning About Cutting Calories. \" Current as of: November 7, 2018 Content Version: 12.2 © 3269-4772 [employer]. Care instructions adapted under license by [employer] (which disclaims liability or warranty for this information). If you have questions about a medical condition or this instruction, always ask your healthcare professional. [place] [address] any warranty or liability for your use of this information.

## 2019-10-08 NOTE — PROGRESS NOTES
Emma Krishna is a 61 y.o. male presenting for Foot Problem (both)  . 1. Have you been to the ER, urgent care clinic since your last visit? Hospitalized since your last visit? No    2. Have you seen or consulted any other health care providers outside of the [address] [place] [address] since your last visit? Include any pap smears or colon screening. No    No flowsheet data found. No flowsheet data found. 3 most recent PHQ Screens 1/8/2019   Little interest or pleasure in doing things Not at all   Feeling down, depressed, irritable, or hopeless Not at all   Total Score PHQ 2 0       There are no discontinued medications.
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
## 2019-12-09 ENCOUNTER — TELEPHONE (OUTPATIENT)
Dept: INTERNAL MEDICINE CLINIC | Age: 59
End: 2019-12-09

## 2019-12-09 ENCOUNTER — OFFICE VISIT (OUTPATIENT)
Dept: INTERNAL MEDICINE CLINIC | Age: 59
End: 2019-12-09

## 2019-12-09 VITALS
WEIGHT: 267.8 LBS | TEMPERATURE: 100.2 F | BODY MASS INDEX: 34.37 KG/M2 | HEIGHT: 74 IN | RESPIRATION RATE: 24 BRPM | HEART RATE: 96 BPM | SYSTOLIC BLOOD PRESSURE: 122 MMHG | OXYGEN SATURATION: 91 % | DIASTOLIC BLOOD PRESSURE: 72 MMHG

## 2019-12-09 DIAGNOSIS — J11.1 INFLUENZA: Primary | ICD-10-CM

## 2019-12-09 LAB
FLUAV RNA SPEC QL NAA+PROBE: POSITIVE
FLUBV RNA SPEC QL NAA+PROBE: NEGATIVE

## 2019-12-09 RX ORDER — OSELTAMIVIR PHOSPHATE 75 MG/1
75 CAPSULE ORAL 2 TIMES DAILY
Qty: 10 CAP | Refills: 0 | Status: SHIPPED | OUTPATIENT
Start: 2019-12-09 | End: 2019-12-14
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
## 2019-12-09 NOTE — PROGRESS NOTES
Irlanda Michelle is a 61 y.o. male presenting for Flu Like Symptoms  . 1. Have you been to the ER, urgent care clinic since your last visit? Hospitalized since your last visit? No    2. Have you seen or consulted any other health care providers outside of the [facility] since your last visit? Include any pap smears or colon screening. No    No flowsheet data found. No flowsheet data found. 3 most recent PHQ Screens 1/8/2019   Little interest or pleasure in doing things Not at all   Feeling down, depressed, irritable, or hopeless Not at all   Total Score PHQ 2 0       There are no discontinued medications.

## 2019-12-09 NOTE — LETTER
NOTIFICATION RETURN TO WORK / SCHOOL 
 
12/9/2019 3:11 PM 
 
Mr. Aysha Rivera [address] [facility] [address] [id number] To Whom It May Concern: 
 
Aysha Rivera is currently under the care of [facility]. He will return to work/school on: 12-16-19 If there are questions or concerns please have the patient contact our office. Sincerely, Luzma Rivera MD

## 2019-12-09 NOTE — PATIENT INSTRUCTIONS
Influenza (Flu): Care Instructions Your Care Instructions Influenza (flu) is an infection in the lungs and breathing passages. It is caused by the influenza virus. There are different strains, or types, of the flu virus from year to year. Unlike the common cold, the flu comes on suddenly and the symptoms, such as a cough, congestion, fever, chills, fatigue, aches, and pains, are more severe. These symptoms may last up to 10 days. Although the flu can make you feel very sick, it usually doesn't cause serious health problems. Home treatment is usually all you need for flu symptoms. But your doctor may prescribe antiviral medicine to prevent other health problems, such as pneumonia, from developing. Older people and those who have a long-term health condition, such as lung disease, are most at risk for having pneumonia or other health problems. Follow-up care is a key part of your treatment and safety. Be sure to make and go to all appointments, and call your doctor if you are having problems. It's also a good idea to know your test results and keep a list of the medicines you take. How can you care for yourself at home? · Get plenty of rest. 
· Drink plenty of fluids, enough so that your urine is light yellow or clear like water. If you have kidney, heart, or liver disease and have to limit fluids, talk with your doctor before you increase the amount of fluids you drink. · Take an over-the-counter pain medicine if needed, such as acetaminophen (Tylenol), ibuprofen (Advil, Motrin), or naproxen (Aleve), to relieve fever, headache, and muscle aches. Read and follow all instructions on the label. No one younger than 20 should take aspirin. It has been linked to Reye syndrome, a serious illness. · Do not smoke. Smoking can make the flu worse. If you need help quitting, talk to your doctor about stop-smoking programs and medicines. These can increase your chances of quitting for good. · Breathe moist air from a hot shower or from a sink filled with hot water to help clear a stuffy nose. · Before you use cough and cold medicines, check the label. These medicines may not be safe for young children or for people with certain health problems. · If the skin around your nose and lips becomes sore, put some petroleum jelly on the area. · To ease coughing: ? Drink fluids to soothe a scratchy throat. ? Suck on cough drops or plain hard candy. ? Take an over-the-counter cough medicine that contains dextromethorphan to help you get some sleep. Read and follow all instructions on the label. ? Raise your head at night with an extra pillow. This may help you rest if coughing keeps you awake. · Take any prescribed medicine exactly as directed. Call your doctor if you think you are having a problem with your medicine. To avoid spreading the flu · Wash your hands regularly, and keep your hands away from your face. · Stay home from school, work, and other public places until you are feeling better and your fever has been gone for at least 24 hours. The fever needs to have gone away on its own without the help of medicine. · Ask people living with you to talk to their doctors about preventing the flu. They may get antiviral medicine to keep from getting the flu from you. · To prevent the flu in the future, get a flu vaccine every fall. Encourage people living with you to get the vaccine. · Cover your mouth when you cough or sneeze. When should you call for help? Call 911 anytime you think you may need emergency care.  For example, call if: 
  · You have severe trouble breathing.  
 Call your doctor now or seek immediate medical care if: 
  · You have new or worse trouble breathing.  
  · You seem to be getting much sicker.  
  · You feel very sleepy or confused.  
  · You have a new or higher fever.  
  · You get a new rash.  
 Watch closely for changes in your health, and be sure to contact your doctor if: 
  · You begin to get better and then get worse.  
  · You are not getting better after 1 week. Where can you learn more? Go to http://aldo-beau.info/. Enter R626 in the search box to learn more about \"Influenza (Flu): Care Instructions. \" Current as of: June 9, 2019 Content Version: 12.2 © 1765-6813 [email or web address]. Care instructions adapted under license by [employer] (which disclaims liability or warranty for this information). If you have questions about a medical condition or this instruction, always ask your healthcare professional. [place] [address] any warranty or liability for your use of this information.

## 2020-04-22 ENCOUNTER — TELEPHONE (OUTPATIENT)
Dept: INTERNAL MEDICINE CLINIC | Age: 60
End: 2020-04-22

## 2020-04-22 NOTE — TELEPHONE ENCOUNTER
Lightheaded/dizzy when he stood up this morning. Had similar episode 2 months ago. Symptoms have improved some now. Patient wants to know what you think.

## 2020-04-22 NOTE — TELEPHONE ENCOUNTER
Likely due to effects of blood pressure medication and prostate medication. Needs to increase p.o. fluids as blood pressure may drop if he is a little dry.

## 2020-06-01 DIAGNOSIS — I10 ESSENTIAL HYPERTENSION: Chronic | ICD-10-CM

## 2020-06-01 RX ORDER — LISINOPRIL AND HYDROCHLOROTHIAZIDE 12.5; 2 MG/1; MG/1
TABLET ORAL
Qty: 30 TAB | Refills: 1 | Status: SHIPPED | OUTPATIENT
Start: 2020-06-01 | End: 2020-07-07 | Stop reason: SDUPTHER

## 2020-06-01 RX ORDER — LISINOPRIL AND HYDROCHLOROTHIAZIDE 12.5; 2 MG/1; MG/1
TABLET ORAL
Qty: 30 TAB | Refills: 4 | OUTPATIENT
Start: 2020-06-01

## 2020-06-01 NOTE — TELEPHONE ENCOUNTER
Requested Prescriptions     Pending Prescriptions Disp Refills    lisinopril-hydroCHLOROthiazide (PRINZIDE, ZESTORETIC) 20-12.5 mg per tablet 30 Tab 1     Sig: TAKE 1 TABLET BY MOUTH EVERY MORNING     Refused Prescriptions Disp Refills    lisinopril-hydroCHLOROthiazide (PRINZIDE, ZESTORETIC) 20-12.5 mg per tablet [Pharmacy Med Name: LISINOPRIL/HCTZ 20-12.5] 30 Tab 4     Sig: TAKE 1 TABLET BY MOUTH EVERY MORNING     Refused By: Odilon Hwang     Reason for Refusal: Appt required, please call patient     Has a future appointment Tuesday, July 07, 2020 03:00pm

## 2020-07-07 ENCOUNTER — OFFICE VISIT (OUTPATIENT)
Dept: INTERNAL MEDICINE CLINIC | Age: 60
End: 2020-07-07

## 2020-07-07 VITALS
OXYGEN SATURATION: 96 % | SYSTOLIC BLOOD PRESSURE: 120 MMHG | BODY MASS INDEX: 33.75 KG/M2 | HEART RATE: 101 BPM | DIASTOLIC BLOOD PRESSURE: 82 MMHG | TEMPERATURE: 97.7 F | HEIGHT: 74 IN | RESPIRATION RATE: 20 BRPM | WEIGHT: 263 LBS

## 2020-07-07 DIAGNOSIS — Z11.59 NEED FOR HEPATITIS C SCREENING TEST: ICD-10-CM

## 2020-07-07 DIAGNOSIS — N20.0 KIDNEY STONES: Chronic | ICD-10-CM

## 2020-07-07 DIAGNOSIS — E79.0 HYPERURICEMIA: Chronic | ICD-10-CM

## 2020-07-07 DIAGNOSIS — I10 ESSENTIAL HYPERTENSION: Primary | Chronic | ICD-10-CM

## 2020-07-07 DIAGNOSIS — R31.9 URINARY TRACT INFECTION WITH HEMATURIA, SITE UNSPECIFIED: ICD-10-CM

## 2020-07-07 DIAGNOSIS — N39.0 URINARY TRACT INFECTION WITH HEMATURIA, SITE UNSPECIFIED: ICD-10-CM

## 2020-07-07 LAB
A-G RATIO,AGRAT: 1.7 RATIO
ALBUMIN SERPL-MCNC: 4.5 G/DL (ref 3.9–5.4)
ALP SERPL-CCNC: 92 U/L (ref 38–126)
ALT SERPL-CCNC: 29 U/L (ref 0–50)
ANION GAP SERPL CALC-SCNC: 9 MMOL/L
AST SERPL W P-5'-P-CCNC: 30 U/L (ref 14–36)
BILIRUB SERPL-MCNC: 0.4 MG/DL (ref 0.2–1.3)
BILIRUB UR QL: NEGATIVE
BUN SERPL-MCNC: 19 MG/DL (ref 9–20)
BUN/CREATININE RATIO,BUCR: 19 RATIO
CALCIUM SERPL-MCNC: 9.5 MG/DL (ref 8.4–10.2)
CHLORIDE SERPL-SCNC: 105 MMOL/L (ref 98–107)
CHOL/HDL RATIO,CHHD: 5 RATIO (ref 0–4)
CHOLEST SERPL-MCNC: 170 MG/DL (ref 0–200)
CLARITY: CLEAR
CO2 SERPL-SCNC: 28 MMOL/L (ref 22–32)
COLOR UR: ABNORMAL
CREAT SERPL-MCNC: 1 MG/DL (ref 0.8–1.5)
ERYTHROCYTE [DISTWIDTH] IN BLOOD BY AUTOMATED COUNT: 13.9 %
GLOBULIN,GLOB: 2.7
GLUCOSE 24H UR-MRATE: NEGATIVE G/(24.H)
GLUCOSE SERPL-MCNC: 84 MG/DL (ref 75–110)
HCT VFR BLD AUTO: 46.7 % (ref 37–51)
HDLC SERPL-MCNC: 31 MG/DL (ref 35–130)
HGB BLD-MCNC: 15.7 G/DL (ref 12–18)
HGB UR QL STRIP: ABNORMAL
KETONES UR QL STRIP.AUTO: NEGATIVE
LDL/HDL RATIO,LDHD: 2 RATIO
LDLC SERPL CALC-MCNC: 63 MG/DL (ref 0–130)
LEUKOCYTE ESTERASE: NEGATIVE
LYMPHOCYTES ABSOLUTE: 1.9 K/UL (ref 0.6–4.1)
LYMPHOCYTES NFR BLD: 27.2 % (ref 10–58.5)
MCH RBC QN AUTO: 28.8 PG (ref 26–32)
MCHC RBC AUTO-ENTMCNC: 33.6 G/DL (ref 30–36)
MCV RBC AUTO: 85.7 FL (ref 80–97)
MONOCYTES ABS-DIF,2141: 0.7 K/UL (ref 0–1.8)
MONOCYTES NFR BLD: 9.2 % (ref 0.1–24)
NEUTROPHILS # BLD: 63.6 % (ref 37–92)
NEUTROPHILS ABS,2156: 4.5 K/UL (ref 2–7.8)
NITRITE UR QL STRIP.AUTO: NEGATIVE
PH UR STRIP: 6 [PH] (ref 5–7)
PLATELET # BLD AUTO: 224 K/UL (ref 140–440)
POTASSIUM SERPL-SCNC: 4.2 MMOL/L (ref 3.6–5)
PROT SERPL-MCNC: 7.2 G/DL (ref 6.3–8.2)
PROT UR STRIP-MCNC: NEGATIVE MG/DL
RBC # BLD AUTO: 5.45 M/UL (ref 4.2–6.3)
RBC #/AREA URNS HPF: ABNORMAL #/HPF
SODIUM SERPL-SCNC: 142 MMOL/L (ref 137–145)
SP GR UR REFRACTOMETRY: 1.02 (ref 1–1.03)
TRIGL SERPL-MCNC: 381 MG/DL (ref 0–200)
URATE SERPL-MCNC: 4.4 MG/DL (ref 3.5–8.5)
UROBILINOGEN UR QL STRIP.AUTO: NEGATIVE
VLDLC SERPL CALC-MCNC: 76 MG/DL
WBC # BLD AUTO: 7.1 K/UL (ref 4.1–10.9)
WBC URNS QL MICRO: 0 #/HPF

## 2020-07-07 RX ORDER — LISINOPRIL AND HYDROCHLOROTHIAZIDE 12.5; 2 MG/1; MG/1
TABLET ORAL
Qty: 90 TAB | Refills: 3 | Status: SHIPPED | OUTPATIENT
Start: 2020-07-07 | End: 2021-07-23

## 2020-07-07 RX ORDER — BISMUTH SUBSALICYLATE 262 MG
1 TABLET,CHEWABLE ORAL DAILY
COMMUNITY

## 2020-07-07 NOTE — PATIENT INSTRUCTIONS
DASH Diet: Care Instructions Your Care Instructions The DASH diet is an eating plan that can help lower your blood pressure. DASH stands for Dietary Approaches to Stop Hypertension. Hypertension is high blood pressure. The DASH diet focuses on eating foods that are high in calcium, potassium, and magnesium. These nutrients can lower blood pressure. The foods that are highest in these nutrients are fruits, vegetables, low-fat dairy products, nuts, seeds, and legumes. But taking calcium, potassium, and magnesium supplements instead of eating foods that are high in those nutrients does not have the same effect. The DASH diet also includes whole grains, fish, and poultry. The DASH diet is one of several lifestyle changes your doctor may recommend to lower your high blood pressure. Your doctor may also want you to decrease the amount of sodium in your diet. Lowering sodium while following the DASH diet can lower blood pressure even further than just the DASH diet alone. Follow-up care is a key part of your treatment and safety. Be sure to make and go to all appointments, and call your doctor if you are having problems. It's also a good idea to know your test results and keep a list of the medicines you take. How can you care for yourself at home? Following the DASH diet · Eat 4 to 5 servings of fruit each day. A serving is 1 medium-sized piece of fruit, ½ cup chopped or canned fruit, 1/4 cup dried fruit, or 4 ounces (½ cup) of fruit juice. Choose fruit more often than fruit juice. · Eat 4 to 5 servings of vegetables each day. A serving is 1 cup of lettuce or raw leafy vegetables, ½ cup of chopped or cooked vegetables, or 4 ounces (½ cup) of vegetable juice. Choose vegetables more often than vegetable juice. · Get 2 to 3 servings of low-fat and fat-free dairy each day. A serving is 8 ounces of milk, 1 cup of yogurt, or 1 ½ ounces of cheese. · Eat 6 to 8 servings of grains each day. A serving is 1 slice of bread, 1 ounce of dry cereal, or ½ cup of cooked rice, pasta, or cooked cereal. Try to choose whole-grain products as much as possible. · Limit lean meat, poultry, and fish to 2 servings each day. A serving is 3 ounces, about the size of a deck of cards. · Eat 4 to 5 servings of nuts, seeds, and legumes (cooked dried beans, lentils, and split peas) each week. A serving is 1/3 cup of nuts, 2 tablespoons of seeds, or ½ cup of cooked beans or peas. · Limit fats and oils to 2 to 3 servings each day. A serving is 1 teaspoon of vegetable oil or 2 tablespoons of salad dressing. · Limit sweets and added sugars to 5 servings or less a week. A serving is 1 tablespoon jelly or jam, ½ cup sorbet, or 1 cup of lemonade. · Eat less than 2,300 milligrams (mg) of sodium a day. If you limit your sodium to 1,500 mg a day, you can lower your blood pressure even more. Tips for success · Start small. Do not try to make dramatic changes to your diet all at once. You might feel that you are missing out on your favorite foods and then be more likely to not follow the plan. Make small changes, and stick with them. Once those changes become habit, add a few more changes. · Try some of the following: ? Make it a goal to eat a fruit or vegetable at every meal and at snacks. This will make it easy to get the recommended amount of fruits and vegetables each day. ? Try yogurt topped with fruit and nuts for a snack or healthy dessert. ? Add lettuce, tomato, cucumber, and onion to sandwiches. ? Combine a ready-made pizza crust with low-fat mozzarella cheese and lots of vegetable toppings. Try using tomatoes, squash, spinach, broccoli, carrots, cauliflower, and onions. ? Have a variety of cut-up vegetables with a low-fat dip as an appetizer instead of chips and dip. ? Sprinkle sunflower seeds or chopped almonds over salads.  Or try adding chopped walnuts or almonds to cooked vegetables. ? Try some vegetarian meals using beans and peas. Add garbanzo or kidney beans to salads. Make burritos and tacos with mashed william beans or black beans. Where can you learn more? Go to http://aldo-beau.info/ Enter L032 in the search box to learn more about \"DASH Diet: Care Instructions. \" Current as of: December 16, 2019               Content Version: 12.5 © 5445-9176 [employer] Care instructions adapted under license by [employer] (which disclaims liability or warranty for this information). If you have questions about a medical condition or this instruction, always ask your healthcare professional. [address] any warranty or liability for your use of this information.

## 2020-07-07 NOTE — PROGRESS NOTES
Luh De La Cruz is a 61 y.o. male presenting for Follow Up Chronic Condition (6 mo fu)  . 1. Have you been to the ER, urgent care clinic since your last visit? Hospitalized since your last visit? No    2. Have you seen or consulted any other health care providers outside of the [address] [place] [address] since your last visit? Include any pap smears or colon screening. No    No flowsheet data found. Abuse Screening Questionnaire 7/7/2020   Do you ever feel afraid of your partner? N   Are you in a relationship with someone who physically or mentally threatens you? N   Is it safe for you to go home? Y       3 most recent PHQ Screens 7/7/2020   Little interest or pleasure in doing things Not at all   Feeling down, depressed, irritable, or hopeless Not at all   Total Score PHQ 2 0       There are no discontinued medications.
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
## 2020-07-08 LAB — HCV AB S/CO SERPL IA: <0.1 S/CO RATIO (ref 0–0.9)

## 2020-07-09 LAB — BACTERIA UR CULT: NO GROWTH

## 2020-07-16 ENCOUNTER — LAB ONLY (OUTPATIENT)
Dept: INTERNAL MEDICINE CLINIC | Age: 60
End: 2020-07-16

## 2020-07-16 DIAGNOSIS — R31.9 HEMATURIA, UNSPECIFIED TYPE: Primary | ICD-10-CM

## 2020-07-16 LAB
BACTERIA,BACTU: ABNORMAL
BILIRUB UR QL: NEGATIVE
CLARITY: CLEAR
COLOR UR: ABNORMAL
GLUCOSE 24H UR-MRATE: NEGATIVE G/(24.H)
HGB UR QL STRIP: ABNORMAL
KETONES UR QL STRIP.AUTO: NEGATIVE
LEUKOCYTE ESTERASE: NEGATIVE
NITRITE UR QL STRIP.AUTO: NEGATIVE
PH UR STRIP: 6 [PH] (ref 5–7)
PROT UR STRIP-MCNC: ABNORMAL MG/DL
RBC #/AREA URNS HPF: ABNORMAL #/HPF
SP GR UR REFRACTOMETRY: 1.02 (ref 1–1.03)
UROBILINOGEN UR QL STRIP.AUTO: ABNORMAL
WBC URNS QL MICRO: ABNORMAL #/HPF

## 2020-07-18 LAB — BACTERIA UR CULT: NO GROWTH

## 2020-07-29 ENCOUNTER — TELEPHONE (OUTPATIENT)
Dept: INTERNAL MEDICINE CLINIC | Age: 60
End: 2020-07-29

## 2020-07-29 NOTE — TELEPHONE ENCOUNTER
Patient states he is light headed and dizzy. He states it may be vertigo. Would like to be seen today.     [facility] [phone]

## 2021-02-15 ENCOUNTER — OFFICE VISIT (OUTPATIENT)
Dept: INTERNAL MEDICINE CLINIC | Age: 61
End: 2021-02-15
Payer: COMMERCIAL

## 2021-02-15 VITALS
WEIGHT: 264.2 LBS | RESPIRATION RATE: 20 BRPM | HEART RATE: 108 BPM | DIASTOLIC BLOOD PRESSURE: 82 MMHG | TEMPERATURE: 94.8 F | BODY MASS INDEX: 33.91 KG/M2 | HEIGHT: 74 IN | OXYGEN SATURATION: 97 % | SYSTOLIC BLOOD PRESSURE: 130 MMHG

## 2021-02-15 DIAGNOSIS — E79.0 HYPERURICEMIA: Chronic | ICD-10-CM

## 2021-02-15 DIAGNOSIS — Z86.16 PERSONAL HISTORY OF COVID-19: ICD-10-CM

## 2021-02-15 DIAGNOSIS — N20.0 KIDNEY STONES: Chronic | ICD-10-CM

## 2021-02-15 DIAGNOSIS — I10 ESSENTIAL HYPERTENSION: Primary | Chronic | ICD-10-CM

## 2021-02-15 PROCEDURE — 99213 OFFICE O/P EST LOW 20 MIN: CPT | Performed by: INTERNAL MEDICINE

## 2021-02-15 NOTE — PATIENT INSTRUCTIONS
DASH Diet: Care Instructions Your Care Instructions The DASH diet is an eating plan that can help lower your blood pressure. DASH stands for Dietary Approaches to Stop Hypertension. Hypertension is high blood pressure. The DASH diet focuses on eating foods that are high in calcium, potassium, and magnesium. These nutrients can lower blood pressure. The foods that are highest in these nutrients are fruits, vegetables, low-fat dairy products, nuts, seeds, and legumes. But taking calcium, potassium, and magnesium supplements instead of eating foods that are high in those nutrients does not have the same effect. The DASH diet also includes whole grains, fish, and poultry. The DASH diet is one of several lifestyle changes your doctor may recommend to lower your high blood pressure. Your doctor may also want you to decrease the amount of sodium in your diet. Lowering sodium while following the DASH diet can lower blood pressure even further than just the DASH diet alone. Follow-up care is a key part of your treatment and safety. Be sure to make and go to all appointments, and call your doctor if you are having problems. It's also a good idea to know your test results and keep a list of the medicines you take. How can you care for yourself at home? Following the DASH diet · Eat 4 to 5 servings of fruit each day. A serving is 1 medium-sized piece of fruit, ½ cup chopped or canned fruit, 1/4 cup dried fruit, or 4 ounces (½ cup) of fruit juice. Choose fruit more often than fruit juice. · Eat 4 to 5 servings of vegetables each day. A serving is 1 cup of lettuce or raw leafy vegetables, ½ cup of chopped or cooked vegetables, or 4 ounces (½ cup) of vegetable juice. Choose vegetables more often than vegetable juice. · Get 2 to 3 servings of low-fat and fat-free dairy each day. A serving is 8 ounces of milk, 1 cup of yogurt, or 1 ½ ounces of cheese. · Eat 6 to 8 servings of grains each day. A serving is 1 slice of bread, 1 ounce of dry cereal, or ½ cup of cooked rice, pasta, or cooked cereal. Try to choose whole-grain products as much as possible. · Limit lean meat, poultry, and fish to 2 servings each day. A serving is 3 ounces, about the size of a deck of cards. · Eat 4 to 5 servings of nuts, seeds, and legumes (cooked dried beans, lentils, and split peas) each week. A serving is 1/3 cup of nuts, 2 tablespoons of seeds, or ½ cup of cooked beans or peas. · Limit fats and oils to 2 to 3 servings each day. A serving is 1 teaspoon of vegetable oil or 2 tablespoons of salad dressing. · Limit sweets and added sugars to 5 servings or less a week. A serving is 1 tablespoon jelly or jam, ½ cup sorbet, or 1 cup of lemonade. · Eat less than 2,300 milligrams (mg) of sodium a day. If you limit your sodium to 1,500 mg a day, you can lower your blood pressure even more. Tips for success · Start small. Do not try to make dramatic changes to your diet all at once. You might feel that you are missing out on your favorite foods and then be more likely to not follow the plan. Make small changes, and stick with them. Once those changes become habit, add a few more changes. · Try some of the following: ? Make it a goal to eat a fruit or vegetable at every meal and at snacks. This will make it easy to get the recommended amount of fruits and vegetables each day. ? Try yogurt topped with fruit and nuts for a snack or healthy dessert. ? Add lettuce, tomato, cucumber, and onion to sandwiches. ? Combine a ready-made pizza crust with low-fat mozzarella cheese and lots of vegetable toppings. Try using tomatoes, squash, spinach, broccoli, carrots, cauliflower, and onions. ? Have a variety of cut-up vegetables with a low-fat dip as an appetizer instead of chips and dip. ? Sprinkle sunflower seeds or chopped almonds over salads. Or try adding chopped walnuts or almonds to cooked vegetables. ? Try some vegetarian meals using beans and peas. Add garbanzo or kidney beans to salads. Make burritos and tacos with mashed william beans or black beans. Where can you learn more? Go to http://www.gray.com/ Enter I686 in the search box to learn more about \"DASH Diet: Care Instructions. \" Current as of: December 16, 2019               Content Version: 12.6 © 3224-4871 [employer]. Care instructions adapted under license by [employer] (which disclaims liability or warranty for this information). If you have questions about a medical condition or this instruction, always ask your healthcare professional. [address] any warranty or liability for your use of this information.

## 2021-02-15 NOTE — PROGRESS NOTES
Xavier Segura is a 61 y.o. male presenting for Follow Up Chronic Condition (6 mo fu)  . 1. Have you been to the ER, urgent care clinic since your last visit? Hospitalized since your last visit? [facility]    2. Have you seen or consulted any other health care providers outside of the [address] [place] [address] since your last visit? Include any pap smears or colon screening. No    No flowsheet data found. Abuse Screening Questionnaire 7/7/2020   Do you ever feel afraid of your partner? N   Are you in a relationship with someone who physically or mentally threatens you? N   Is it safe for you to go home? Y       3 most recent PHQ Screens 2/15/2021   Little interest or pleasure in doing things Not at all   Feeling down, depressed, irritable, or hopeless Not at all   Total Score PHQ 2 0       There are no discontinued medications.
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
## 2021-03-10 ENCOUNTER — TELEPHONE (OUTPATIENT)
Dept: INTERNAL MEDICINE CLINIC | Age: 61
End: 2021-03-10

## 2021-03-10 DIAGNOSIS — S39.012A BACK STRAIN, INITIAL ENCOUNTER: Primary | ICD-10-CM

## 2021-03-10 RX ORDER — CYCLOBENZAPRINE HCL 10 MG
10 TABLET ORAL
Qty: 30 TAB | Refills: 0 | Status: SHIPPED | OUTPATIENT
Start: 2021-03-10 | End: 2021-08-16 | Stop reason: ALTCHOICE

## 2021-03-10 NOTE — TELEPHONE ENCOUNTER
If he thinks it is muscular we can call in a muscle relaxant to take at night and he could apply heat to the area 3-4 times a day.   If no better in the next 5 or 6 days would have to be seen in the office

## 2021-03-10 NOTE — TELEPHONE ENCOUNTER
Patient states he has pain in his upper right side of his back. Advil is not working. He is not sure if it is a muscle. Please advise.     Valentino Valdovinos [phone]

## 2021-08-16 ENCOUNTER — OFFICE VISIT (OUTPATIENT)
Dept: INTERNAL MEDICINE CLINIC | Age: 61
End: 2021-08-16
Payer: COMMERCIAL

## 2021-08-16 VITALS
SYSTOLIC BLOOD PRESSURE: 122 MMHG | BODY MASS INDEX: 34.01 KG/M2 | DIASTOLIC BLOOD PRESSURE: 80 MMHG | HEIGHT: 74 IN | RESPIRATION RATE: 20 BRPM | OXYGEN SATURATION: 97 % | TEMPERATURE: 97.6 F | HEART RATE: 81 BPM | WEIGHT: 265 LBS

## 2021-08-16 DIAGNOSIS — N20.0 KIDNEY STONES: Chronic | ICD-10-CM

## 2021-08-16 DIAGNOSIS — E79.0 HYPERURICEMIA: Chronic | ICD-10-CM

## 2021-08-16 DIAGNOSIS — I10 ESSENTIAL HYPERTENSION: Primary | Chronic | ICD-10-CM

## 2021-08-16 PROCEDURE — 99213 OFFICE O/P EST LOW 20 MIN: CPT | Performed by: INTERNAL MEDICINE

## 2021-08-16 NOTE — PROGRESS NOTES
Batsheva Atkins is a 64 y.o. male presenting for Follow Up Chronic Condition (6 mo fu)  . 1. Have you been to the ER, urgent care clinic since your last visit? Hospitalized since your last visit? No    2. Have you seen or consulted any other health care providers outside of the [address] [place] [address] since your last visit? Include any pap smears or colon screening. No    No flowsheet data found. Abuse Screening Questionnaire 7/7/2020   Do you ever feel afraid of your partner? N   Are you in a relationship with someone who physically or mentally threatens you? N   Is it safe for you to go home? Y       3 most recent PHQ Screens 2/15/2021   Little interest or pleasure in doing things Not at all   Feeling down, depressed, irritable, or hopeless Not at all   Total Score PHQ 2 0       There are no discontinued medications.
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
## 2021-08-17 LAB
ALBUMIN SERPL-MCNC: 4.1 G/DL (ref 3.8–4.8)
ALBUMIN/GLOB SERPL: 1.6 {RATIO} (ref 1.2–2.2)
ALP SERPL-CCNC: 85 IU/L (ref 48–121)
ALT SERPL-CCNC: 24 IU/L (ref 0–44)
APPEARANCE UR: CLEAR
AST SERPL-CCNC: 25 IU/L (ref 0–40)
BACTERIA #/AREA URNS HPF: NORMAL /[HPF]
BASOPHILS # BLD AUTO: 0.1 X10E3/UL (ref 0–0.2)
BASOPHILS NFR BLD AUTO: 1 %
BILIRUB SERPL-MCNC: <0.2 MG/DL (ref 0–1.2)
BILIRUB UR QL STRIP: NEGATIVE
BUN SERPL-MCNC: 17 MG/DL (ref 8–27)
BUN/CREAT SERPL: 18 (ref 10–24)
CALCIUM SERPL-MCNC: 9.1 MG/DL (ref 8.6–10.2)
CASTS URNS QL MICRO: NORMAL /LPF
CHLORIDE SERPL-SCNC: 101 MMOL/L (ref 96–106)
CHOLEST SERPL-MCNC: 179 MG/DL (ref 100–199)
CO2 SERPL-SCNC: 28 MMOL/L (ref 20–29)
COLOR UR: YELLOW
CREAT SERPL-MCNC: 0.92 MG/DL (ref 0.76–1.27)
EOSINOPHIL # BLD AUTO: 0.3 X10E3/UL (ref 0–0.4)
EOSINOPHIL NFR BLD AUTO: 4 %
EPI CELLS #/AREA URNS HPF: NORMAL /HPF (ref 0–10)
ERYTHROCYTE [DISTWIDTH] IN BLOOD BY AUTOMATED COUNT: 12.4 % (ref 11.6–15.4)
GLOBULIN SER CALC-MCNC: 2.6 G/DL (ref 1.5–4.5)
GLUCOSE SERPL-MCNC: 100 MG/DL (ref 65–99)
GLUCOSE UR QL: NEGATIVE
HCT VFR BLD AUTO: 48 % (ref 37.5–51)
HDLC SERPL-MCNC: 29 MG/DL
HGB BLD-MCNC: 15.6 G/DL (ref 13–17.7)
HGB UR QL STRIP: NEGATIVE
IMM GRANULOCYTES # BLD AUTO: 0 X10E3/UL (ref 0–0.1)
IMM GRANULOCYTES NFR BLD AUTO: 0 %
KETONES UR QL STRIP: NEGATIVE
LDLC SERPL CALC-MCNC: 97 MG/DL (ref 0–99)
LEUKOCYTE ESTERASE UR QL STRIP: NEGATIVE
LYMPHOCYTES # BLD AUTO: 2 X10E3/UL (ref 0.7–3.1)
LYMPHOCYTES NFR BLD AUTO: 28 %
MCH RBC QN AUTO: 27.4 PG (ref 26.6–33)
MCHC RBC AUTO-ENTMCNC: 32.5 G/DL (ref 31.5–35.7)
MCV RBC AUTO: 84 FL (ref 79–97)
MICRO URNS: ABNORMAL
MICRO URNS: ABNORMAL
MONOCYTES # BLD AUTO: 0.6 X10E3/UL (ref 0.1–0.9)
MONOCYTES NFR BLD AUTO: 9 %
NEUTROPHILS # BLD AUTO: 4 X10E3/UL (ref 1.4–7)
NEUTROPHILS NFR BLD AUTO: 58 %
NITRITE UR QL STRIP: NEGATIVE
PH UR STRIP: 5.5 [PH] (ref 5–7.5)
PLATELET # BLD AUTO: 264 X10E3/UL (ref 150–450)
POTASSIUM SERPL-SCNC: 4.4 MMOL/L (ref 3.5–5.2)
PROT SERPL-MCNC: 6.7 G/DL (ref 6–8.5)
PROT UR QL STRIP: NEGATIVE
RBC # BLD AUTO: 5.69 X10E6/UL (ref 4.14–5.8)
RBC #/AREA URNS HPF: NORMAL /HPF (ref 0–2)
SODIUM SERPL-SCNC: 139 MMOL/L (ref 134–144)
SP GR UR: >=1.03 (ref 1–1.03)
TRIGL SERPL-MCNC: 317 MG/DL (ref 0–149)
TSH SERPL DL<=0.005 MIU/L-ACNC: 3.07 UIU/ML (ref 0.45–4.5)
URATE SERPL-MCNC: 4 MG/DL (ref 3.8–8.4)
URINALYSIS REFLEX, 377202: ABNORMAL
UROBILINOGEN UR STRIP-MCNC: 0.2 MG/DL (ref 0.2–1)
VLDLC SERPL CALC-MCNC: 53 MG/DL (ref 5–40)
WBC # BLD AUTO: 7 X10E3/UL (ref 3.4–10.8)
WBC #/AREA URNS HPF: NORMAL /HPF (ref 0–5)

## 2021-09-01 ENCOUNTER — TELEPHONE (OUTPATIENT)
Dept: INTERNAL MEDICINE CLINIC | Age: 61
End: 2021-09-01

## 2021-09-01 NOTE — TELEPHONE ENCOUNTER
Patient states he was lightheaded so he had his bp checked by the nurse. It is 90/70. Does he need to be seen?     [facility] [phone]

## 2021-09-01 NOTE — TELEPHONE ENCOUNTER
Have patient hold his medication for a couple of days and increase his p.o. fluid consumption.   Can restart medication once blood pressure comes back up

## 2022-03-18 PROBLEM — S39.012A BACK STRAIN: Status: ACTIVE | Noted: 2017-08-02
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
## 2022-03-18 PROBLEM — N20.0 KIDNEY STONES: Status: ACTIVE | Noted: 2019-01-08

## 2022-03-19 PROBLEM — S70.11XA CONTUSION OF RIGHT THIGH: Status: ACTIVE | Noted: 2017-08-02

## 2022-03-19 PROBLEM — I10 ESSENTIAL HYPERTENSION: Status: ACTIVE | Noted: 2017-08-02

## 2022-03-19 PROBLEM — M77.41 METATARSALGIA OF BOTH FEET: Status: ACTIVE | Noted: 2019-10-08

## 2022-03-19 PROBLEM — S43.409A SHOULDER SPRAIN: Status: ACTIVE | Noted: 2017-08-02

## 2022-03-19 PROBLEM — Z86.16 PERSONAL HISTORY OF COVID-19: Status: ACTIVE | Noted: 2021-02-15

## 2022-03-19 PROBLEM — R91.1 PULMONARY NODULE: Status: ACTIVE | Noted: 2017-08-02

## 2022-03-19 PROBLEM — S53.402A SPRAIN OF LEFT ELBOW: Status: ACTIVE | Noted: 2017-08-02

## 2022-03-19 PROBLEM — M77.42 METATARSALGIA OF BOTH FEET: Status: ACTIVE | Noted: 2019-10-08

## 2022-03-19 PROBLEM — S16.1XXA STRAIN OF NECK MUSCLE: Status: ACTIVE | Noted: 2017-08-02

## 2022-03-19 PROBLEM — M54.2 PAIN OF CERVICAL SPINE: Status: ACTIVE | Noted: 2017-08-02

## 2022-12-24 ENCOUNTER — APPOINTMENT (OUTPATIENT)
Dept: GENERAL RADIOLOGY | Age: 62
DRG: 310 | End: 2022-12-24
Attending: EMERGENCY MEDICINE
Payer: COMMERCIAL

## 2022-12-24 ENCOUNTER — HOSPITAL ENCOUNTER (INPATIENT)
Age: 62
LOS: 1 days | Discharge: HOME | DRG: 310 | End: 2022-12-25
Attending: STUDENT IN AN ORGANIZED HEALTH CARE EDUCATION/TRAINING PROGRAM | Admitting: INTERNAL MEDICINE
Payer: COMMERCIAL

## 2022-12-24 DIAGNOSIS — I47.1 SVT (SUPRAVENTRICULAR TACHYCARDIA) (HCC): Primary | ICD-10-CM

## 2022-12-24 PROBLEM — I47.19 AVNRT (AV NODAL RE-ENTRY TACHYCARDIA) (HCC): Status: ACTIVE | Noted: 2022-12-24

## 2022-12-24 LAB
ALBUMIN SERPL-MCNC: 3.6 G/DL (ref 3.5–5)
ALBUMIN/GLOB SERPL: 1 {RATIO} (ref 1.1–2.2)
ALP SERPL-CCNC: 106 U/L (ref 45–117)
ALT SERPL-CCNC: 37 U/L (ref 12–78)
ANION GAP SERPL CALC-SCNC: 6 MMOL/L (ref 5–15)
AST SERPL-CCNC: 20 U/L (ref 15–37)
BASOPHILS # BLD: 0.1 K/UL (ref 0–0.1)
BASOPHILS NFR BLD: 1 % (ref 0–1)
BILIRUB SERPL-MCNC: 0.5 MG/DL (ref 0.2–1)
BNP SERPL-MCNC: 171 PG/ML
BUN SERPL-MCNC: 14 MG/DL (ref 6–20)
BUN/CREAT SERPL: 13 (ref 12–20)
CALCIUM SERPL-MCNC: 9.1 MG/DL (ref 8.5–10.1)
CHLORIDE SERPL-SCNC: 106 MMOL/L (ref 97–108)
CO2 SERPL-SCNC: 29 MMOL/L (ref 21–32)
CREAT SERPL-MCNC: 1.11 MG/DL (ref 0.7–1.3)
DIFFERENTIAL METHOD BLD: ABNORMAL
EOSINOPHIL # BLD: 0.3 K/UL (ref 0–0.4)
EOSINOPHIL NFR BLD: 3 % (ref 0–7)
ERYTHROCYTE [DISTWIDTH] IN BLOOD BY AUTOMATED COUNT: 13.1 % (ref 11.5–14.5)
GLOBULIN SER CALC-MCNC: 3.6 G/DL (ref 2–4)
GLUCOSE SERPL-MCNC: 122 MG/DL (ref 65–100)
HCT VFR BLD AUTO: 49.5 % (ref 36.6–50.3)
HGB BLD-MCNC: 16.5 G/DL (ref 12.1–17)
IMM GRANULOCYTES # BLD AUTO: 0 K/UL (ref 0–0.04)
IMM GRANULOCYTES NFR BLD AUTO: 1 % (ref 0–0.5)
LYMPHOCYTES # BLD: 1.8 K/UL (ref 0.8–3.5)
LYMPHOCYTES NFR BLD: 20 % (ref 12–49)
MCH RBC QN AUTO: 28.2 PG (ref 26–34)
MCHC RBC AUTO-ENTMCNC: 33.3 G/DL (ref 30–36.5)
MCV RBC AUTO: 84.5 FL (ref 80–99)
MONOCYTES # BLD: 0.6 K/UL (ref 0–1)
MONOCYTES NFR BLD: 7 % (ref 5–13)
NEUTS SEG # BLD: 5.9 K/UL (ref 1.8–8)
NEUTS SEG NFR BLD: 68 % (ref 32–75)
NRBC # BLD: 0 K/UL (ref 0–0.01)
NRBC BLD-RTO: 0 PER 100 WBC
PLATELET # BLD AUTO: 251 K/UL (ref 150–400)
PMV BLD AUTO: 10 FL (ref 8.9–12.9)
POTASSIUM SERPL-SCNC: 4 MMOL/L (ref 3.5–5.1)
PROT SERPL-MCNC: 7.2 G/DL (ref 6.4–8.2)
RBC # BLD AUTO: 5.86 M/UL (ref 4.1–5.7)
SODIUM SERPL-SCNC: 141 MMOL/L (ref 136–145)
TROPONIN-HIGH SENSITIVITY: 44 NG/L (ref 0–76)
WBC # BLD AUTO: 8.6 K/UL (ref 4.1–11.1)

## 2022-12-24 PROCEDURE — 93005 ELECTROCARDIOGRAM TRACING: CPT

## 2022-12-24 PROCEDURE — 71045 X-RAY EXAM CHEST 1 VIEW: CPT

## 2022-12-24 PROCEDURE — 65270000046 HC RM TELEMETRY

## 2022-12-24 PROCEDURE — 74011000250 HC RX REV CODE- 250: Performed by: PHYSICIAN ASSISTANT

## 2022-12-24 PROCEDURE — 80053 COMPREHEN METABOLIC PANEL: CPT

## 2022-12-24 PROCEDURE — 74011250637 HC RX REV CODE- 250/637: Performed by: INTERNAL MEDICINE

## 2022-12-24 PROCEDURE — 84484 ASSAY OF TROPONIN QUANT: CPT

## 2022-12-24 PROCEDURE — 74011250636 HC RX REV CODE- 250/636: Performed by: STUDENT IN AN ORGANIZED HEALTH CARE EDUCATION/TRAINING PROGRAM

## 2022-12-24 PROCEDURE — 99285 EMERGENCY DEPT VISIT HI MDM: CPT

## 2022-12-24 PROCEDURE — 83880 ASSAY OF NATRIURETIC PEPTIDE: CPT

## 2022-12-24 PROCEDURE — 85025 COMPLETE CBC W/AUTO DIFF WBC: CPT

## 2022-12-24 PROCEDURE — 74011000250 HC RX REV CODE- 250: Performed by: INTERNAL MEDICINE

## 2022-12-24 PROCEDURE — 74011000250 HC RX REV CODE- 250

## 2022-12-24 PROCEDURE — 36415 COLL VENOUS BLD VENIPUNCTURE: CPT

## 2022-12-24 PROCEDURE — 96374 THER/PROPH/DIAG INJ IV PUSH: CPT

## 2022-12-24 PROCEDURE — 74011250636 HC RX REV CODE- 250/636: Performed by: INTERNAL MEDICINE

## 2022-12-24 RX ORDER — SODIUM CHLORIDE 0.9 % (FLUSH) 0.9 %
5-40 SYRINGE (ML) INJECTION EVERY 8 HOURS
Status: DISCONTINUED | OUTPATIENT
Start: 2022-12-24 | End: 2022-12-25 | Stop reason: HOSPADM

## 2022-12-24 RX ORDER — ONDANSETRON 4 MG/1
4 TABLET, ORALLY DISINTEGRATING ORAL
Status: DISCONTINUED | OUTPATIENT
Start: 2022-12-24 | End: 2022-12-25 | Stop reason: HOSPADM

## 2022-12-24 RX ORDER — ADENOSINE 3 MG/ML
6 INJECTION, SOLUTION INTRAVENOUS ONCE
Status: ACTIVE | OUTPATIENT
Start: 2022-12-24 | End: 2022-12-25

## 2022-12-24 RX ORDER — POLYETHYLENE GLYCOL 3350 17 G/17G
17 POWDER, FOR SOLUTION ORAL DAILY PRN
Status: DISCONTINUED | OUTPATIENT
Start: 2022-12-24 | End: 2022-12-25 | Stop reason: HOSPADM

## 2022-12-24 RX ORDER — METOPROLOL TARTRATE 5 MG/5ML
INJECTION INTRAVENOUS
Status: COMPLETED
Start: 2022-12-24 | End: 2022-12-24

## 2022-12-24 RX ORDER — ADENOSINE 3 MG/ML
12 INJECTION, SOLUTION INTRAVENOUS ONCE
Status: COMPLETED | OUTPATIENT
Start: 2022-12-24 | End: 2022-12-24

## 2022-12-24 RX ORDER — SODIUM CHLORIDE 9 MG/ML
100 INJECTION, SOLUTION INTRAVENOUS CONTINUOUS
Status: DISCONTINUED | OUTPATIENT
Start: 2022-12-25 | End: 2022-12-25 | Stop reason: HOSPADM

## 2022-12-24 RX ORDER — ALLOPURINOL 100 MG/1
100 TABLET ORAL DAILY
Status: DISCONTINUED | OUTPATIENT
Start: 2022-12-25 | End: 2022-12-25 | Stop reason: HOSPADM

## 2022-12-24 RX ORDER — ENOXAPARIN SODIUM 100 MG/ML
40 INJECTION SUBCUTANEOUS DAILY
Status: DISCONTINUED | OUTPATIENT
Start: 2022-12-25 | End: 2022-12-24 | Stop reason: DRUGHIGH

## 2022-12-24 RX ORDER — ADENOSINE 3 MG/ML
INJECTION, SOLUTION INTRAVENOUS
Status: DISPENSED
Start: 2022-12-24 | End: 2022-12-25

## 2022-12-24 RX ORDER — ACETAMINOPHEN 650 MG/1
650 SUPPOSITORY RECTAL
Status: DISCONTINUED | OUTPATIENT
Start: 2022-12-24 | End: 2022-12-25 | Stop reason: HOSPADM

## 2022-12-24 RX ORDER — ONDANSETRON 2 MG/ML
4 INJECTION INTRAMUSCULAR; INTRAVENOUS
Status: DISCONTINUED | OUTPATIENT
Start: 2022-12-24 | End: 2022-12-25 | Stop reason: HOSPADM

## 2022-12-24 RX ORDER — METOPROLOL TARTRATE 25 MG/1
25 TABLET, FILM COATED ORAL ONCE
Status: COMPLETED | OUTPATIENT
Start: 2022-12-24 | End: 2022-12-24

## 2022-12-24 RX ORDER — TAMSULOSIN HYDROCHLORIDE 0.4 MG/1
0.4 CAPSULE ORAL DAILY
Status: DISCONTINUED | OUTPATIENT
Start: 2022-12-25 | End: 2022-12-25 | Stop reason: HOSPADM

## 2022-12-24 RX ORDER — SODIUM CHLORIDE 0.9 % (FLUSH) 0.9 %
5-40 SYRINGE (ML) INJECTION AS NEEDED
Status: DISCONTINUED | OUTPATIENT
Start: 2022-12-24 | End: 2022-12-25 | Stop reason: HOSPADM

## 2022-12-24 RX ORDER — METOPROLOL TARTRATE 5 MG/5ML
2.5 INJECTION INTRAVENOUS ONCE
Status: COMPLETED | OUTPATIENT
Start: 2022-12-25 | End: 2022-12-24

## 2022-12-24 RX ORDER — METOPROLOL TARTRATE 25 MG/1
25 TABLET, FILM COATED ORAL EVERY 6 HOURS
Status: DISCONTINUED | OUTPATIENT
Start: 2022-12-24 | End: 2022-12-25

## 2022-12-24 RX ORDER — ASPIRIN 325 MG
325 TABLET ORAL DAILY
Status: DISCONTINUED | OUTPATIENT
Start: 2022-12-25 | End: 2022-12-25 | Stop reason: HOSPADM

## 2022-12-24 RX ORDER — ENOXAPARIN SODIUM 100 MG/ML
30 INJECTION SUBCUTANEOUS EVERY 12 HOURS
Status: DISCONTINUED | OUTPATIENT
Start: 2022-12-24 | End: 2022-12-25 | Stop reason: HOSPADM

## 2022-12-24 RX ORDER — ACETAMINOPHEN 325 MG/1
650 TABLET ORAL
Status: DISCONTINUED | OUTPATIENT
Start: 2022-12-24 | End: 2022-12-25 | Stop reason: HOSPADM

## 2022-12-24 RX ADMIN — SODIUM CHLORIDE, PRESERVATIVE FREE 10 ML: 5 INJECTION INTRAVENOUS at 21:16

## 2022-12-24 RX ADMIN — ADENOSINE 12 MG: 3 INJECTION, SOLUTION INTRAVENOUS at 17:22

## 2022-12-24 RX ADMIN — ENOXAPARIN SODIUM 30 MG: 100 INJECTION SUBCUTANEOUS at 21:14

## 2022-12-24 RX ADMIN — SODIUM CHLORIDE 1000 ML: 9 INJECTION, SOLUTION INTRAVENOUS at 16:32

## 2022-12-24 RX ADMIN — METOPROLOL TARTRATE 2.5 MG: 5 INJECTION, SOLUTION INTRAVENOUS at 23:40

## 2022-12-24 RX ADMIN — METOPROLOL TARTRATE 2.5 MG: 5 INJECTION, SOLUTION INTRAVENOUS at 23:45

## 2022-12-24 RX ADMIN — METOPROLOL TARTRATE 2.5 MG: 5 INJECTION INTRAVENOUS at 23:40

## 2022-12-24 RX ADMIN — METOPROLOL TARTRATE 25 MG: 25 TABLET, FILM COATED ORAL at 17:31

## 2022-12-24 RX ADMIN — SODIUM CHLORIDE, PRESERVATIVE FREE 10 ML: 5 INJECTION INTRAVENOUS at 21:14

## 2022-12-24 RX ADMIN — METOPROLOL TARTRATE 25 MG: 25 TABLET, FILM COATED ORAL at 23:29

## 2022-12-24 NOTE — ED NOTES
Pt. Presents to ED today for complaints of rapid HR. Patient reports an episode of HR in the 170s today while at home. HR resolved but then happened again. Patient decided to come to the ED. Patient denies any CP. Pt. Alert and oriented x4. Pt. Placed in position of comfort with call bell in reach.
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
## 2022-12-25 VITALS
OXYGEN SATURATION: 96 % | HEART RATE: 69 BPM | HEIGHT: 72 IN | SYSTOLIC BLOOD PRESSURE: 118 MMHG | BODY MASS INDEX: 36.16 KG/M2 | TEMPERATURE: 97.5 F | WEIGHT: 266.98 LBS | RESPIRATION RATE: 16 BRPM | DIASTOLIC BLOOD PRESSURE: 71 MMHG

## 2022-12-25 LAB
ANION GAP SERPL CALC-SCNC: 7 MMOL/L (ref 5–15)
ATRIAL RATE: 115 BPM
ATRIAL RATE: 166 BPM
BUN SERPL-MCNC: 13 MG/DL (ref 6–20)
BUN/CREAT SERPL: 14 (ref 12–20)
CALCIUM SERPL-MCNC: 8.8 MG/DL (ref 8.5–10.1)
CALCULATED P AXIS, ECG09: 38 DEGREES
CALCULATED R AXIS, ECG10: 2 DEGREES
CALCULATED T AXIS, ECG11: 8 DEGREES
CALCULATED T AXIS, ECG11: 9 DEGREES
CHLORIDE SERPL-SCNC: 109 MMOL/L (ref 97–108)
CO2 SERPL-SCNC: 25 MMOL/L (ref 21–32)
CREAT SERPL-MCNC: 0.96 MG/DL (ref 0.7–1.3)
DIAGNOSIS, 93000: NORMAL
DIAGNOSIS, 93000: NORMAL
ERYTHROCYTE [DISTWIDTH] IN BLOOD BY AUTOMATED COUNT: 13.2 % (ref 11.5–14.5)
GLUCOSE SERPL-MCNC: 114 MG/DL (ref 65–100)
HCT VFR BLD AUTO: 48.7 % (ref 36.6–50.3)
HGB BLD-MCNC: 16.1 G/DL (ref 12.1–17)
MAGNESIUM SERPL-MCNC: 2.2 MG/DL (ref 1.6–2.4)
MCH RBC QN AUTO: 27.8 PG (ref 26–34)
MCHC RBC AUTO-ENTMCNC: 33.1 G/DL (ref 30–36.5)
MCV RBC AUTO: 84.1 FL (ref 80–99)
NRBC # BLD: 0 K/UL (ref 0–0.01)
NRBC BLD-RTO: 0 PER 100 WBC
P-R INTERVAL, ECG05: 216 MS
PLATELET # BLD AUTO: 257 K/UL (ref 150–400)
PMV BLD AUTO: 10 FL (ref 8.9–12.9)
POTASSIUM SERPL-SCNC: 4.1 MMOL/L (ref 3.5–5.1)
Q-T INTERVAL, ECG07: 272 MS
Q-T INTERVAL, ECG07: 318 MS
QRS DURATION, ECG06: 84 MS
QRS DURATION, ECG06: 92 MS
QTC CALCULATION (BEZET), ECG08: 439 MS
QTC CALCULATION (BEZET), ECG08: 458 MS
RBC # BLD AUTO: 5.79 M/UL (ref 4.1–5.7)
SODIUM SERPL-SCNC: 141 MMOL/L (ref 136–145)
VENTRICULAR RATE, ECG03: 115 BPM
VENTRICULAR RATE, ECG03: 171 BPM
WBC # BLD AUTO: 9.6 K/UL (ref 4.1–11.1)

## 2022-12-25 PROCEDURE — 74011000250 HC RX REV CODE- 250: Performed by: PHYSICIAN ASSISTANT

## 2022-12-25 PROCEDURE — 80048 BASIC METABOLIC PNL TOTAL CA: CPT

## 2022-12-25 PROCEDURE — 74011250637 HC RX REV CODE- 250/637: Performed by: INTERNAL MEDICINE

## 2022-12-25 PROCEDURE — 85027 COMPLETE CBC AUTOMATED: CPT

## 2022-12-25 PROCEDURE — 36415 COLL VENOUS BLD VENIPUNCTURE: CPT

## 2022-12-25 PROCEDURE — 74011250636 HC RX REV CODE- 250/636: Performed by: INTERNAL MEDICINE

## 2022-12-25 PROCEDURE — 83735 ASSAY OF MAGNESIUM: CPT

## 2022-12-25 PROCEDURE — 74011250636 HC RX REV CODE- 250/636: Performed by: PHYSICIAN ASSISTANT

## 2022-12-25 RX ORDER — METOPROLOL TARTRATE 50 MG/1
50 TABLET ORAL EVERY 12 HOURS
Status: DISCONTINUED | OUTPATIENT
Start: 2022-12-25 | End: 2022-12-25 | Stop reason: HOSPADM

## 2022-12-25 RX ORDER — ASPIRIN 325 MG
325 TABLET ORAL DAILY
Qty: 30 TABLET | Refills: 0 | Status: SHIPPED | OUTPATIENT
Start: 2022-12-26

## 2022-12-25 RX ORDER — DILTIAZEM HYDROCHLORIDE 120 MG/1
120 CAPSULE, COATED, EXTENDED RELEASE ORAL DAILY
Status: DISCONTINUED | OUTPATIENT
Start: 2022-12-25 | End: 2022-12-25 | Stop reason: HOSPADM

## 2022-12-25 RX ORDER — DILTIAZEM HYDROCHLORIDE 120 MG/1
120 CAPSULE, COATED, EXTENDED RELEASE ORAL DAILY
Qty: 30 CAPSULE | Refills: 0 | Status: SHIPPED | OUTPATIENT
Start: 2022-12-26

## 2022-12-25 RX ORDER — METOPROLOL TARTRATE 50 MG/1
50 TABLET ORAL EVERY 12 HOURS
Qty: 60 TABLET | Refills: 0 | Status: SHIPPED | OUTPATIENT
Start: 2022-12-25

## 2022-12-25 RX ADMIN — ASPIRIN 325 MG ORAL TABLET 325 MG: 325 PILL ORAL at 09:17

## 2022-12-25 RX ADMIN — TAMSULOSIN HYDROCHLORIDE 0.4 MG: 0.4 CAPSULE ORAL at 09:17

## 2022-12-25 RX ADMIN — METOPROLOL TARTRATE 50 MG: 50 TABLET ORAL at 09:17

## 2022-12-25 RX ADMIN — DILTIAZEM HYDROCHLORIDE 120 MG: 120 CAPSULE, COATED, EXTENDED RELEASE ORAL at 10:00

## 2022-12-25 RX ADMIN — SODIUM CHLORIDE 100 ML/HR: 9 INJECTION, SOLUTION INTRAVENOUS at 00:02

## 2022-12-25 RX ADMIN — ALLOPURINOL 100 MG: 100 TABLET ORAL at 09:17

## 2022-12-25 RX ADMIN — METOPROLOL TARTRATE 25 MG: 25 TABLET, FILM COATED ORAL at 05:28

## 2022-12-25 RX ADMIN — ENOXAPARIN SODIUM 30 MG: 100 INJECTION SUBCUTANEOUS at 09:17

## 2022-12-25 RX ADMIN — DEXTROSE MONOHYDRATE 2.5 MG/HR: 50 INJECTION, SOLUTION INTRAVENOUS at 00:02

## 2022-12-25 RX ADMIN — SODIUM CHLORIDE 100 ML/HR: 9 INJECTION, SOLUTION INTRAVENOUS at 09:19

## 2022-12-25 NOTE — PROGRESS NOTES
Rapid response note: This 19-year-old male admitted today with SVT requiring adenosine for improvement in overall heart rate. Cardiology consultation was started on metoprolol. Cardiogram has been ordered for further evaluation. Have received numerous texts regarding the patient converting into SVT although the patient has remained asymptomatic. Patient again developed SVT with a heart rate of approximately 170 although remained asymptomatic and a rapid response was initiated. Patient was given metoprolol 2.5 mg x 2 with minimal improvement in overall heart rate. Patient did not want additional adenosine as he felt like it was \"going to make me die\". Will initiate Cardizem drip and obtain rate control as the patient will then convert out of SVT. Continue to monitor closely. Start IV fluids to maintain blood pressure.   If blood pressure decreases below 456 systolically will attempt an amiodarone drip

## 2022-12-25 NOTE — PROGRESS NOTES
10:46am-No further CM needs identified. CM notified pt's nurse of d/c.    10:44am- CM met with pt, pt's wife and another family member at bedside to discuss d/c plan. CM inquired with pt about any needs. No new needs at this time. Pt stated that his wife will transport at d/c.      Angi Cosme [address] [facility]  [phone]

## 2022-12-25 NOTE — PROGRESS NOTES
RAPID RESPONSE:    Responded to RRT called for HR sustained 150's. A fib with RVR on EKG. BP stable. Patient reports palpitations by no chest pain, SOB.   LETY Jamil at bedside. Orders received to give Metoprolol 2.5mg IV x 1.   2348: Orders received to repeat metoprolol 2.5mg IV. /65. HR remains 140's.   2350: NS @ 100ml/hr ordered and started. 2355: HR remains 140's, patient remains asymptomatic. Orders received to start Cardizem drip.  0002:Cardiem drip started at 2.5mg/min per order. 0018: Incidentally spoke with Dr. Chris Jane when another nurse paged him. Notified of above. Orders received to NOT give any more metoprolol IV.

## 2022-12-25 NOTE — PROGRESS NOTES
2049: TRANSFER - IN REPORT:    Verbal report received from Emily LAZCANO(name) on Aliya Sunshine  being received from ED(unit) for routine progression of care      Report consisted of patients Situation, Background, Assessment and   Recommendations(SBAR). Information from the following report(s) SBAR, Kardex, ED Summary, Intake/Output, MAR, Recent Results, and Cardiac Rhythm NSR  was reviewed with the receiving nurse. Opportunity for questions and clarification was provided. Assessment completed upon patients arrival to unit and care assumed. 2324: Patient converted to what appears like afib RVR. EKG initiated which resulted to SVT. Patient resting in bed, asymptomatic. HR sustaining on the 140s and /94. Perfect Serve Reasoner PA. Awaiting orders. 2326: Patient resting in bed and converted back to NSR. Metoprolol 25mg PO scheduled given at this time. 2337: Patient converted back to SVT with HR sustaining on 170s. Patient resting in bed, asymptomatic. No PRN ordered. Called Rapid Response overhead. 2340: Haleigh DAVIDSON and Minerva LANDINRN at bedside. Metoprolol 2.5mg IV given. HR sustaining on the 140s. 2348: Another Metoprolol 2.5mg IV given, HR 140s.    0002: /65. Order to start continuous fluids and diltiazem drip at 2.5mg.     0037: HR continuous to sustain on the 140s. Increased dilt drip to 7.5mg/hr. 0103: Patient converted to NSR. Radha Loaiza Reasonhernesto DAVIDSON.     8807: Order to hold diltiazem drip for now. End of Shift Note    Bedside shift change report given to Dean Jones (oncoming nurse) by Isamar Lawson RN (offgoing nurse). Report included the following information SBAR, Kardex, ED Summary, Intake/Output, MAR, Recent Results, and Cardiac Rhythm NSR/ST    Shift worked:  [id number]     Shift summary and any significant changes:     SEE NOTES ABOVE; patient in and out of afib.       Concerns for physician to address:  Heart rate control;      Zone phone for oncoming shift: Activity:  Activity Level: Up ad olga  Number times ambulated in hallways past shift: 0  Number of times OOB to chair past shift: 0    Cardiac:   Cardiac Monitoring: Yes      Cardiac Rhythm: Atrial Fib    Access:  Current line(s): PIV     Genitourinary:   Urinary status: voiding    Respiratory:   O2 Device: None (Room air)  Chronic home O2 use?: NO  Incentive spirometer at bedside: NO       GI:  Last Bowel Movement Date: 12/25/22  Current diet:  ADULT DIET Regular  Passing flatus: YES  Tolerating current diet: YES       Pain Management:   Patient states pain is manageable on current regimen: YES    Skin:  Marcelino Score: 23  Interventions: float heels and increase time out of bed    Patient Safety:  Fall Score:    Interventions: assistive device (walker, cane, etc), gripper socks, and pt to call before getting OOB       Length of Stay:  Expected LOS: - - -  Actual LOS: [address], RN

## 2022-12-25 NOTE — ED NOTES
Bedside shift change report given to Omari Christianson RN and [address] (oncoming nurse) by Radha Rutledge RN (offgoing nurse). Report included the following information SBAR, ED Summary, MAR and Recent Results. Pt resting comfortably in stretcher at this time, family at bedside, pt A&Ox4.

## 2022-12-25 NOTE — ED NOTES
TRANSITION OF CARE - SBAR OUT    Patient is being transferred to [facility], Room# 2267. Report GIVEN TO Clara Marion RN on Sharona Penaloza for routine progression of care. Report is consisted of the following information SBAR, ED Summary, MAR, Recent Results, and Cardiac Rhythm NSR . Patient transferred to receiving unit by: ED RN (RN or Tech Name)     Called outstanding consults: Yes   Collected routine labs: Yes     All current orders reviewed with accepting nurse: Yes    The following personal items will be sent with the patient during transfer to the floor: All valuables:      Visual Aid: None                   CARDIAC MONITORING ORDERED: Yes     The following CURRENT information were reported to the receiving RN:    CODE STATUS: Full Code    NIH SCORE:    CARLY SCREENING:      NEURO ASSESSMENT:        RESTRAINTS IN USE: No      IS DOCUMENTATION COMPLETE: Yes      Vital Signs  Level of Consciousness: Alert (0) (12/24/22 1535)  Temp: 98.3 °F (36.8 °C) (12/24/22 1712)  Temp Source: Oral (12/24/22 1712)  Pulse (Heart Rate): (!) 122 (12/24/22 1732)  Cardiac Rhythm:  (SVT to NSR) (12/24/22 1657)  Resp Rate: 18 (12/24/22 1732)  BP: 106/77 (12/24/22 1712)  MAP (Monitor): 86 (12/24/22 1712)  MAP (Calculated): 87 (12/24/22 1712)  BP Patient Position: Sitting (12/24/22 1535)  MEWS Score: 1 (12/24/22 1535)  Pain 1  Pain Scale 1: Numeric (0 - 10) (12/24/22 1535)  Pain Intensity 1: 0 (12/24/22 1535)      OXYGEN: Oxygen Therapy  O2 Device: None (Room air) (12/24/22 1535)    KINDER FALL ASSESSMENT:  Presents to emergency department  because of falls (Syncope, seizure, or loss of consciousness): No, Age > 79: No, Altered Mental Status, Intoxication with alcohol or substance confusion (Disorientation, impaired judgment, poor safety awaremess, or inability to follow instructions): No, Impaired Mobility: Ambulates or transfers with assistive devices or assistance;  Unable to ambulate or transer.: No, Nursing Judgement : No    WOUNDS: No      URINARY CATHETER: voiding    LINE ACCESS:   Peripheral IV 12/24/22 Left Antecubital (Active)        Opportunity for questions and clarification were provided.   Radha Paz RN

## 2022-12-25 NOTE — DISCHARGE SUMMARY
Hospitalist Discharge Summary     Patient ID:  Tato Serna  [id number]  5 y.o.  1960 12/24/2022    PCP on record: Marilyn Dixon MD    Admit date: 12/24/2022  Discharge date and time: 12/25/2022    DISCHARGE DIAGNOSIS:    SVT  ALEXANDER non compliant with CPAP  Hypertension  BPH  Remote history of Atrial fibrillation. CONSULTATIONS:  IP CONSULT TO CARDIOLOGY    Excerpted HPI from H&P of [place] Cho MD:    Tato Serna is a 58 y.o.   male who presents with past medical history of hypertension, BPH, nephrolithiasis is coming the hospital chief complaints of palpitations. Patient reports started this morning which are fast, irregular associated with some chest thumping.   ______________________________________________________________________  DISCHARGE SUMMARY/HOSPITAL COURSE:  for full details see H&P, daily progress notes, labs, consult notes. On arrival to ED, was noted to have SVT, was given adenosine. Started on metoprolol and diltiazem drip. Off drip now. Patient was started on po diltiazem. Patient is now in sinus rhythm. Started on aspirin 325 mg po daily. ECHO is pending. However, cardiology recommends outpatient ECHO. Patient is cleared by cardiology. Patient is stable for discharge with outpatient cardiology and PCP follow up.         _______________________________________________________________________  Patient seen and examined by me on discharge day. Pertinent Findings:  Gen:    Not in distress  Chest: Clear lungs  CVS:   Regular rhythm. No edema  Abd:  Soft, not distended, not tender  Neuro:  Alert, oriented  _______________________________________________________________________  DISCHARGE MEDICATIONS:   Current Discharge Medication List        START taking these medications    Details   aspirin (ASPIRIN) 325 mg tablet Take 1 Tablet by mouth daily.   Qty: 30 Tablet, Refills: 0  Start date: 12/26/2022      dilTIAZem ER (CARDIZEM CD) 120 mg capsule Take 1 Capsule by mouth daily.  Qty: 30 Capsule, Refills: 0  Start date: 12/26/2022      metoprolol tartrate (LOPRESSOR) 50 mg tablet Take 1 Tablet by mouth every twelve (12) hours. Qty: 60 Tablet, Refills: 0  Start date: 12/25/2022           CONTINUE these medications which have NOT CHANGED    Details   multivitamin (ONE A DAY) tablet Take 1 Tab by mouth daily. potassium citrate (UROCIT-K10) 10 mEq (1,080 mg) TbER Take 10 mEq by mouth two (2) times a day. tamsulosin (FLOMAX) 0.4 mg capsule Take 0.4 mg by mouth daily. allopurinol (ZYLOPRIM) 100 mg tablet Take  by mouth daily. STOP taking these medications       lisinopril-hydroCHLOROthiazide (PRINZIDE, ZESTORETIC) 20-12.5 mg per tablet Comments:   Reason for Stopping:                 Patient Follow Up Instructions: Activity: Activity as tolerated  Diet: Cardiac Diet  Wound Care: None needed    Follow-up with PCP, cardiology in 1 week.   Follow-up tests/labs none  Follow-up Information       Follow up With Specialties Details Why Contact Info    Edda Maldonado MD Internal Medicine Physician   [place] [address] [place], [place] [address] [facility]  [phone]            ________________________________________________________________    Risk of deterioration: Moderate    Condition at Discharge:  Stable  __________________________________________________________________    Disposition  Home with family, no needs    ____________________________________________________________________    Code Status: Full Code  ___________________________________________________________________      Total time in minutes spent coordinating this discharge (includes going over instructions, follow-up, prescriptions, and preparing report for sign off to her PCP) :  35 minutes    Signed:  Jf Bueno MD

## 2022-12-25 NOTE — PROGRESS NOTES
Responded to Rapid Response and patient received on room air with SpO2 of 96%. Rapid response called for irregular heart rhythm; no respiratory interventions needed at this time.
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
## 2022-12-25 NOTE — PROGRESS NOTES
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 Currently off the dilt gtt. I think if his HRs and rhythm remain sinus and controlled by 1PM he can go home. My office will arrange appt with Dr. Lara Gutierrez. Change metoprolol to 50mg q12h  Continue ASA 325mg  Would continue to hold lisinopril  Echo pending; wont likely happen till Tuesday; can get in my office. He does NOT need to stay for an echo. [x]       High complexity decision making was performed in this patient at high risk for decompensation with multiple organ involvement. Subjective:     Ariella Willis denies chest pain, dyspnea. Discussed with RN events overnight. Review of Systems:    Symptom Y/N Comments  Symptom Y/N Comments   Fever/Chills N   Chest Pain N    Poor Appetite N   Edema N    Cough N   Abdominal Pain N    Sputum N   Joint Pain N    SOB/AGUILAR N   Pruritis/Rash N    Nausea/vomit N   Tolerating PT/OT Y    Diarrhea N   Tolerating Diet Y    Constipation N   Other       Could NOT obtain due to:      Objective:      Physical Exam:    Last 24hrs VS reviewed since prior progress note.  Most recent are:    Visit Vitals  /71 (BP 1 Location: Right upper arm, BP Patient Position: At rest)   Pulse 67   Temp 97.7 °F (36.5 °C)   Resp 16   Ht 6' (1.829 m)   Wt 121.1 kg (266 lb 15.6 oz)   SpO2 97%   BMI 36.21 kg/m²       Intake/Output Summary (Last 24 hours) at 12/25/2022 0821  Last data filed at 12/25/2022 0409  Gross per 24 hour   Intake 419.13 ml   Output --   Net 419.13 ml        General Appearance: Well developed, well nourished, alert & oriented x 3,    no acute distress. Ears/Nose/Mouth/Throat: Hearing grossly normal.  Neck: Supple. Chest: Lungs clear to auscultation bilaterally. Cardiovascular: Regular rate and rhythm, S1S2 normal, no murmur. Abdomen: Soft, non-tender, bowel sounds are active. Extremities: No edema bilaterally. Skin: Warm and dry. []         Post-cath site without hematoma, bruit, tenderness, or thrill. Distal pulses intact. PMH/ reviewed - no change compared to H&P    Data Review    Telemetry: sinus rhythm     EKG:   []  No new EKG for review    Lab Data Personally Reviewed:    Recent Labs     12/25/22  0415 12/24/22  1547   WBC 9.6 8.6   HGB 16.1 16.5   HCT 48.7 49.5    251     No results for input(s): INR, PTP, APTT, INREXT in the last 72 hours. Recent Labs     12/25/22  0415 12/24/22  1547    141   K 4.1 4.0   * 106   CO2 25 29   BUN 13 14   CREA 0.96 1.11   * 122*   CA 8.8 9.1   MG 2.2  --      No results for input(s): CPK, CKNDX, TROIQ in the last 72 hours. No lab exists for component: CPKMB  Lab Results   Component Value Date/Time    Cholesterol, total 179 08/16/2021 03:28 PM    HDL Cholesterol 29 (L) 08/16/2021 03:28 PM    LDL, calculated 97 08/16/2021 03:28 PM    LDL, calculated 63 07/07/2020 03:28 PM    Triglyceride 317 (H) 08/16/2021 03:28 PM    CHOL/HDL Ratio 5 (H) 07/07/2020 03:28 PM       Recent Labs     12/24/22  1547      TP 7.2   ALB 3.6   GLOB 3.6     No results for input(s): PH, PCO2, PO2 in the last 72 hours.     Medications Personally Reviewed:    Current Facility-Administered Medications   Medication Dose Route Frequency    metoprolol tartrate (LOPRESSOR) tablet 50 mg  50 mg Oral Q12H    aspirin tablet 325 mg  325 mg Oral DAILY    allopurinoL (ZYLOPRIM) tablet 100 mg  100 mg Oral DAILY    tamsulosin (FLOMAX) capsule 0.4 mg  0.4 mg Oral DAILY    sodium chloride (NS) flush 5-40 mL  5-40 mL IntraVENous Q8H    sodium chloride (NS) flush 5-40 mL  5-40 mL IntraVENous PRN    acetaminophen (TYLENOL) tablet 650 mg  650 mg Oral Q6H PRN    Or    acetaminophen (TYLENOL) suppository 650 mg  650 mg Rectal Q6H PRN    polyethylene glycol (MIRALAX) packet 17 g  17 g Oral DAILY PRN    ondansetron (ZOFRAN ODT) tablet 4 mg  4 mg Oral Q8H PRN    Or    ondansetron (ZOFRAN) injection 4 mg  4 mg IntraVENous Q6H PRN    enoxaparin (LOVENOX) injection 30 mg  30 mg SubCUTAneous Q12H    0.9% sodium chloride infusion  100 mL/hr IntraVENous CONTINUOUS    dilTIAZem (CARDIZEM) 100 mg in dextrose 5% (MBP/ADV) 100 mL infusion  0-15 mg/hr IntraVENous TITRATE         Madisyn Marquez III, DO
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
## 2022-12-25 NOTE — PROGRESS NOTES
1330 Pt did not have any tachycardia today. VS remain stable. Pt was given discharge instruction, verbalized understanding. Pt was discharged home with wife.

## 2025-01-02 PROBLEM — E66.01 SEVERE OBESITY (BMI >= 40): Status: ACTIVE | Noted: 2025-01-02

## 2025-02-05 ENCOUNTER — HOSPITAL ENCOUNTER (OUTPATIENT)
Facility: HOSPITAL | Age: 65
Setting detail: OBSERVATION
LOS: 1 days | Discharge: HOME | End: 2025-02-07
Attending: EMERGENCY MEDICINE | Admitting: INTERNAL MEDICINE
Payer: COMMERCIAL

## 2025-02-05 ENCOUNTER — APPOINTMENT (OUTPATIENT)
Facility: HOSPITAL | Age: 65
End: 2025-02-05
Payer: COMMERCIAL

## 2025-02-05 DIAGNOSIS — I48.0 PAROXYSMAL ATRIAL FIBRILLATION (HCC): Primary | ICD-10-CM

## 2025-02-05 DIAGNOSIS — I48.91 A-FIB (HCC): ICD-10-CM

## 2025-02-05 DIAGNOSIS — I48.91 ATRIAL FIBRILLATION WITH RVR (HCC): ICD-10-CM

## 2025-02-05 LAB
ALBUMIN SERPL-MCNC: 3.5 G/DL (ref 3.5–5)
ALBUMIN/GLOB SERPL: 1 (ref 1.1–2.2)
ALP SERPL-CCNC: 84 U/L (ref 45–117)
ALT SERPL-CCNC: 50 U/L (ref 12–78)
ANION GAP SERPL CALC-SCNC: 5 MMOL/L (ref 2–12)
AST SERPL-CCNC: 27 U/L (ref 15–37)
BASOPHILS # BLD: 0.07 K/UL (ref 0–0.1)
BASOPHILS NFR BLD: 0.9 % (ref 0–1)
BILIRUB SERPL-MCNC: 0.6 MG/DL (ref 0.2–1)
BUN SERPL-MCNC: 25 MG/DL (ref 6–20)
BUN/CREAT SERPL: 22 (ref 12–20)
CALCIUM SERPL-MCNC: 9.2 MG/DL (ref 8.5–10.1)
CHLORIDE SERPL-SCNC: 108 MMOL/L (ref 97–108)
CO2 SERPL-SCNC: 25 MMOL/L (ref 21–32)
CREAT SERPL-MCNC: 1.13 MG/DL (ref 0.7–1.3)
DIFFERENTIAL METHOD BLD: ABNORMAL
EOSINOPHIL # BLD: 0.16 K/UL (ref 0–0.4)
EOSINOPHIL NFR BLD: 2 % (ref 0–7)
ERYTHROCYTE [DISTWIDTH] IN BLOOD BY AUTOMATED COUNT: 13.3 % (ref 11.5–14.5)
GLOBULIN SER CALC-MCNC: 3.4 G/DL (ref 2–4)
GLUCOSE SERPL-MCNC: 118 MG/DL (ref 65–100)
HCT VFR BLD AUTO: 51.1 % (ref 36.6–50.3)
HGB BLD-MCNC: 17.4 G/DL (ref 12.1–17)
IMM GRANULOCYTES # BLD AUTO: 0.02 K/UL (ref 0–0.04)
IMM GRANULOCYTES NFR BLD AUTO: 0.2 % (ref 0–0.5)
LYMPHOCYTES # BLD: 1.81 K/UL (ref 0.8–3.5)
LYMPHOCYTES NFR BLD: 22.2 % (ref 12–49)
MAGNESIUM SERPL-MCNC: 2.4 MG/DL (ref 1.6–2.4)
MCH RBC QN AUTO: 28.1 PG (ref 26–34)
MCHC RBC AUTO-ENTMCNC: 34.1 G/DL (ref 30–36.5)
MCV RBC AUTO: 82.6 FL (ref 80–99)
MONOCYTES # BLD: 0.57 K/UL (ref 0–1)
MONOCYTES NFR BLD: 7 % (ref 5–13)
NEUTS SEG # BLD: 5.52 K/UL (ref 1.8–8)
NEUTS SEG NFR BLD: 67.7 % (ref 32–75)
NRBC # BLD: 0 K/UL (ref 0–0.01)
NRBC BLD-RTO: 0 PER 100 WBC
PLATELET # BLD AUTO: 305 K/UL (ref 150–400)
PMV BLD AUTO: 10.1 FL (ref 8.9–12.9)
POTASSIUM SERPL-SCNC: 4.4 MMOL/L (ref 3.5–5.1)
PROT SERPL-MCNC: 6.9 G/DL (ref 6.4–8.2)
RBC # BLD AUTO: 6.19 M/UL (ref 4.1–5.7)
SODIUM SERPL-SCNC: 138 MMOL/L (ref 136–145)
TROPONIN I SERPL HS-MCNC: 12 NG/L (ref 0–76)
TROPONIN I SERPL HS-MCNC: 13 NG/L (ref 0–76)
WBC # BLD AUTO: 8.2 K/UL (ref 4.1–11.1)

## 2025-02-05 PROCEDURE — G0378 HOSPITAL OBSERVATION PER HR: HCPCS

## 2025-02-05 PROCEDURE — 93005 ELECTROCARDIOGRAM TRACING: CPT | Performed by: INTERNAL MEDICINE

## 2025-02-05 PROCEDURE — 96361 HYDRATE IV INFUSION ADD-ON: CPT

## 2025-02-05 PROCEDURE — 6370000000 HC RX 637 (ALT 250 FOR IP): Performed by: NURSE PRACTITIONER

## 2025-02-05 PROCEDURE — 36415 COLL VENOUS BLD VENIPUNCTURE: CPT

## 2025-02-05 PROCEDURE — 80053 COMPREHEN METABOLIC PANEL: CPT

## 2025-02-05 PROCEDURE — 2580000003 HC RX 258: Performed by: EMERGENCY MEDICINE

## 2025-02-05 PROCEDURE — 2500000003 HC RX 250 WO HCPCS: Performed by: EMERGENCY MEDICINE

## 2025-02-05 PROCEDURE — 96374 THER/PROPH/DIAG INJ IV PUSH: CPT

## 2025-02-05 PROCEDURE — 6370000000 HC RX 637 (ALT 250 FOR IP): Performed by: INTERNAL MEDICINE

## 2025-02-05 PROCEDURE — 99285 EMERGENCY DEPT VISIT HI MDM: CPT

## 2025-02-05 PROCEDURE — 85025 COMPLETE CBC W/AUTO DIFF WBC: CPT

## 2025-02-05 PROCEDURE — 71045 X-RAY EXAM CHEST 1 VIEW: CPT

## 2025-02-05 PROCEDURE — 83735 ASSAY OF MAGNESIUM: CPT

## 2025-02-05 PROCEDURE — 2500000003 HC RX 250 WO HCPCS: Performed by: INTERNAL MEDICINE

## 2025-02-05 PROCEDURE — 84484 ASSAY OF TROPONIN QUANT: CPT

## 2025-02-05 RX ORDER — METOPROLOL TARTRATE 50 MG
50 TABLET ORAL 2 TIMES DAILY
Status: DISCONTINUED | OUTPATIENT
Start: 2025-02-05 | End: 2025-02-07 | Stop reason: HOSPADM

## 2025-02-05 RX ORDER — POTASSIUM CHLORIDE 7.45 MG/ML
10 INJECTION INTRAVENOUS PRN
Status: DISCONTINUED | OUTPATIENT
Start: 2025-02-05 | End: 2025-02-07 | Stop reason: HOSPADM

## 2025-02-05 RX ORDER — ONDANSETRON 4 MG/1
4 TABLET, ORALLY DISINTEGRATING ORAL EVERY 8 HOURS PRN
Status: DISCONTINUED | OUTPATIENT
Start: 2025-02-05 | End: 2025-02-07 | Stop reason: HOSPADM

## 2025-02-05 RX ORDER — 0.9 % SODIUM CHLORIDE 0.9 %
500 INTRAVENOUS SOLUTION INTRAVENOUS ONCE
Status: COMPLETED | OUTPATIENT
Start: 2025-02-05 | End: 2025-02-05

## 2025-02-05 RX ORDER — METOPROLOL TARTRATE 50 MG
50 TABLET ORAL 2 TIMES DAILY
Status: DISCONTINUED | OUTPATIENT
Start: 2025-02-05 | End: 2025-02-05

## 2025-02-05 RX ORDER — METOPROLOL TARTRATE 50 MG
50 TABLET ORAL EVERY 12 HOURS
Status: DISCONTINUED | OUTPATIENT
Start: 2025-02-05 | End: 2025-02-05

## 2025-02-05 RX ORDER — POLYETHYLENE GLYCOL 3350 17 G/17G
17 POWDER, FOR SOLUTION ORAL DAILY PRN
Status: DISCONTINUED | OUTPATIENT
Start: 2025-02-05 | End: 2025-02-07 | Stop reason: HOSPADM

## 2025-02-05 RX ORDER — ACETAMINOPHEN 650 MG/1
650 SUPPOSITORY RECTAL EVERY 6 HOURS PRN
Status: DISCONTINUED | OUTPATIENT
Start: 2025-02-05 | End: 2025-02-07 | Stop reason: HOSPADM

## 2025-02-05 RX ORDER — ALLOPURINOL 100 MG/1
100 TABLET ORAL DAILY
Status: DISCONTINUED | OUTPATIENT
Start: 2025-02-06 | End: 2025-02-07 | Stop reason: HOSPADM

## 2025-02-05 RX ORDER — SODIUM CHLORIDE 9 MG/ML
INJECTION, SOLUTION INTRAVENOUS PRN
Status: DISCONTINUED | OUTPATIENT
Start: 2025-02-05 | End: 2025-02-07 | Stop reason: HOSPADM

## 2025-02-05 RX ORDER — MAGNESIUM SULFATE IN WATER 40 MG/ML
2000 INJECTION, SOLUTION INTRAVENOUS PRN
Status: DISCONTINUED | OUTPATIENT
Start: 2025-02-05 | End: 2025-02-07 | Stop reason: HOSPADM

## 2025-02-05 RX ORDER — ONDANSETRON 2 MG/ML
4 INJECTION INTRAMUSCULAR; INTRAVENOUS EVERY 6 HOURS PRN
Status: DISCONTINUED | OUTPATIENT
Start: 2025-02-05 | End: 2025-02-07 | Stop reason: HOSPADM

## 2025-02-05 RX ORDER — SODIUM CHLORIDE 0.9 % (FLUSH) 0.9 %
5-40 SYRINGE (ML) INJECTION EVERY 12 HOURS SCHEDULED
Status: DISCONTINUED | OUTPATIENT
Start: 2025-02-05 | End: 2025-02-07 | Stop reason: HOSPADM

## 2025-02-05 RX ORDER — POTASSIUM CHLORIDE 750 MG/1
10 TABLET, EXTENDED RELEASE ORAL 2 TIMES DAILY
Status: DISCONTINUED | OUTPATIENT
Start: 2025-02-06 | End: 2025-02-07 | Stop reason: HOSPADM

## 2025-02-05 RX ORDER — POTASSIUM CHLORIDE 1500 MG/1
40 TABLET, EXTENDED RELEASE ORAL PRN
Status: DISCONTINUED | OUTPATIENT
Start: 2025-02-05 | End: 2025-02-07 | Stop reason: HOSPADM

## 2025-02-05 RX ORDER — TAMSULOSIN HYDROCHLORIDE 0.4 MG/1
0.4 CAPSULE ORAL DAILY
Status: DISCONTINUED | OUTPATIENT
Start: 2025-02-06 | End: 2025-02-07 | Stop reason: HOSPADM

## 2025-02-05 RX ORDER — DILTIAZEM HYDROCHLORIDE 5 MG/ML
10 INJECTION INTRAVENOUS
Status: DISPENSED | OUTPATIENT
Start: 2025-02-05 | End: 2025-02-06

## 2025-02-05 RX ORDER — FINASTERIDE 5 MG/1
5 TABLET, FILM COATED ORAL DAILY
Status: DISCONTINUED | OUTPATIENT
Start: 2025-02-06 | End: 2025-02-07 | Stop reason: HOSPADM

## 2025-02-05 RX ORDER — ASPIRIN 325 MG
325 TABLET ORAL DAILY
Status: DISCONTINUED | OUTPATIENT
Start: 2025-02-06 | End: 2025-02-07 | Stop reason: HOSPADM

## 2025-02-05 RX ORDER — SODIUM CHLORIDE 0.9 % (FLUSH) 0.9 %
5-40 SYRINGE (ML) INJECTION PRN
Status: DISCONTINUED | OUTPATIENT
Start: 2025-02-05 | End: 2025-02-07 | Stop reason: HOSPADM

## 2025-02-05 RX ORDER — DILTIAZEM HYDROCHLORIDE 5 MG/ML
10 INJECTION INTRAVENOUS
Status: COMPLETED | OUTPATIENT
Start: 2025-02-05 | End: 2025-02-05

## 2025-02-05 RX ORDER — POTASSIUM CITRATE 10 MEQ/1
10 TABLET, EXTENDED RELEASE ORAL 2 TIMES DAILY
Status: DISCONTINUED | OUTPATIENT
Start: 2025-02-05 | End: 2025-02-05

## 2025-02-05 RX ORDER — DILTIAZEM HYDROCHLORIDE 120 MG/1
120 CAPSULE, COATED, EXTENDED RELEASE ORAL DAILY
Status: DISCONTINUED | OUTPATIENT
Start: 2025-02-05 | End: 2025-02-07 | Stop reason: HOSPADM

## 2025-02-05 RX ORDER — ACETAMINOPHEN 325 MG/1
650 TABLET ORAL EVERY 6 HOURS PRN
Status: DISCONTINUED | OUTPATIENT
Start: 2025-02-05 | End: 2025-02-07 | Stop reason: HOSPADM

## 2025-02-05 RX ADMIN — SODIUM CHLORIDE, PRESERVATIVE FREE 10 ML: 5 INJECTION INTRAVENOUS at 21:11

## 2025-02-05 RX ADMIN — DILTIAZEM HYDROCHLORIDE 10 MG: 5 INJECTION, SOLUTION INTRAVENOUS at 10:24

## 2025-02-05 RX ADMIN — METOPROLOL TARTRATE 50 MG: 50 TABLET, FILM COATED ORAL at 21:10

## 2025-02-05 RX ADMIN — SODIUM CHLORIDE 500 ML: 9 INJECTION, SOLUTION INTRAVENOUS at 10:23

## 2025-02-05 RX ADMIN — APIXABAN 5 MG: 5 TABLET, FILM COATED ORAL at 21:10

## 2025-02-05 RX ADMIN — APIXABAN 5 MG: 5 TABLET, FILM COATED ORAL at 13:42

## 2025-02-05 ASSESSMENT — LIFESTYLE VARIABLES
HOW MANY STANDARD DRINKS CONTAINING ALCOHOL DO YOU HAVE ON A TYPICAL DAY: PATIENT DOES NOT DRINK
HOW OFTEN DO YOU HAVE A DRINK CONTAINING ALCOHOL: NEVER

## 2025-02-05 NOTE — PROGRESS NOTES
[facility]        Consult    NAME: Tobin Mosher   :  1960   MRN:  [id number]     Date/Time:  2025 12:06 PM    Patient PCP: Ramiro Bautista MD  ________________________________________________________________________    Attending attestation:     I saw and evaluated Tobin Mosher on 25.  The  case was discussed with Nette Rivera NP.  I personally reviewed the HPI, PMH, FMH, Soc Hx, ROS, and medications.  I repeated pertinent portions of the examination and reviewed the relevant imaging and laboratory data.  I agree with the findings, assessment, and plan as documented.      HPI:Symptomatic Afib with RVR     Physical Exam: A+OX3, NAD, irregular, no edema, lungs clear     Vitals and labs reviewed     Assessment     Symptomatic Afib with RVR , paroxysmal, now persistent for last several days     Plan:    Start Dilt   Anticoagulation   TRANG and DCCV   Consider AAD after and ablation eventually ..     >>>>  After discussion with Dr Lawrence may consider ablation since there is cancellation         Thank you for this consult and allowing me to take part in this patients care.  Please call with questions.      Signed By: Alison Oscar MD     2025            ____________________________________________________________     Assessment:     Paroxysmal Atrial Fibrillation  Paroxysmal SVT  History of AVNRT  Hypertension  Obstructive Sleep Apnea  History of Nose Bleeds    Full Code  -t/-e/-s      Plan:   Further plan per Dr Dayne YORK 1, Was noted to be in Afib with RVR. Two doses of Dilt 10 IV with controlled rate. Home Metoprolol 50 bid and Dilt 120 daily. He took an extra half of Metoprolol per Dr Lawrence this AM. Needs to be on OAC while in Afib. Start Eliquis 5 bid. Will discuss possible TRANG/DCCV. Long term patient requests ablation. TTE ordered.   He is noncompliant with CPAP as the mask does not fit him well. He has stopped caffeine now but  orthopnea, PND, edema, syncope   Gastrointestinal:  abdominal pain , N/V, diarrhea, dysphagia, constipation, bleeding   Genitourinary:  frequency, urgency, dysuria, hematuria, incontinence   Muskuloskeletal :  arthralgia, myalgia, back pain  Hematology:  easy bruising, nose or gum bleeding, lymphadenopathy   Dermatological: rash, ulceration, pruritis, color change / jaundice  Endocrine:   hot flashes or polydipsia   Neurological:  headache, dizziness, confusion, focal weakness, paresthesia,     Speech difficulties, memory loss, gait difficulty, lightheadedness  Psychological: Feelings of anxiety, depression, agitation    Objective:      Physical Exam:    Last 24hrs VS reviewed since prior progress note. Most recent are:    /65   Pulse (!) 104   Temp 97.9 °F (36.6 °C) (Axillary)   Resp 17   Ht 1.829 m (6')   Wt 118.8 kg (262 lb)   SpO2 96%   BMI 35.53 kg/m²   No intake or output data in the 24 hours ending 02/05/25 1206     General Appearance: Well developed, well nourished, alert & oriented x 3,    no acute distress.  Ears/Nose/Mouth/Throat: Pupils equal and round, Hearing grossly normal.  Neck: Supple.  JVP within normal limits. Carotids good upstrokes, with no bruit.  Chest: Lungs clear to auscultation bilaterally.  Cardiovascular: irregular rate and rhythm, S1S2 normal, no murmur, rubs, gallops.  Abdomen: Soft, non-tender, bowel sounds are active. No organomegaly.  Extremities: No edema bilaterally. Femoral pulses +2, Distal Pulses +1.  Skin: Warm and dry.  Neuro: CN II-XII grossly intact, Strength and sensation grossly intact.    Data:      Prior to Admission medications    Medication Sig Start Date End Date Taking? Authorizing Provider   finasteride (PROSCAR) 5 MG tablet Take 1 tablet by mouth daily    ProviderFlo MD   diclofenac (VOLTAREN) 75 MG EC tablet TAKE 1 TABLET BY MOUTH TWICE A DAY WITH FOOD FOR PAIN AND INFLAMMATION    Provider, MD Flo   allopurinol (ZYLOPRIM) 100 MG
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
## 2025-02-05 NOTE — ED NOTES
Nette Rivera NP at bedside to update patient and family on plan of care.  Confirmed to hold second dose of diltiazem.  Aware of patient BP 95/49.  Verbal orders to re-check before evening metoprolol given.

## 2025-02-05 NOTE — ED NOTES
Patient has already taken medications this AM.  Paged Dr. Oscar to see if he would like second dose of dilt given.
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
## 2025-02-06 ENCOUNTER — ANESTHESIA EVENT (OUTPATIENT)
Facility: HOSPITAL | Age: 65
End: 2025-02-06
Payer: COMMERCIAL

## 2025-02-06 ENCOUNTER — ANESTHESIA (OUTPATIENT)
Facility: HOSPITAL | Age: 65
End: 2025-02-06
Payer: COMMERCIAL

## 2025-02-06 ENCOUNTER — HOSPITAL ENCOUNTER (OUTPATIENT)
Facility: HOSPITAL | Age: 65
Discharge: HOME | End: 2025-02-08
Attending: INTERNAL MEDICINE

## 2025-02-06 ENCOUNTER — HOSPITAL ENCOUNTER (OUTPATIENT)
Facility: HOSPITAL | Age: 65
Setting detail: OBSERVATION
Discharge: HOME | End: 2025-02-08
Attending: INTERNAL MEDICINE
Payer: COMMERCIAL

## 2025-02-06 VITALS
SYSTOLIC BLOOD PRESSURE: 114 MMHG | HEIGHT: 72 IN | HEART RATE: 102 BPM | WEIGHT: 262 LBS | BODY MASS INDEX: 35.49 KG/M2 | DIASTOLIC BLOOD PRESSURE: 88 MMHG

## 2025-02-06 LAB
ACT BLD: 256 SECS (ref 79–138)
ACT BLD: 285 SECS (ref 79–138)
ALBUMIN SERPL-MCNC: 3.2 G/DL (ref 3.5–5)
ALBUMIN/GLOB SERPL: 1 (ref 1.1–2.2)
ALP SERPL-CCNC: 78 U/L (ref 45–117)
ALT SERPL-CCNC: 49 U/L (ref 12–78)
ANION GAP SERPL CALC-SCNC: 4 MMOL/L (ref 2–12)
AST SERPL-CCNC: 22 U/L (ref 15–37)
BILIRUB SERPL-MCNC: 0.4 MG/DL (ref 0.2–1)
BUN SERPL-MCNC: 24 MG/DL (ref 6–20)
BUN/CREAT SERPL: 23 (ref 12–20)
CALCIUM SERPL-MCNC: 8.3 MG/DL (ref 8.5–10.1)
CHLORIDE SERPL-SCNC: 109 MMOL/L (ref 97–108)
CO2 SERPL-SCNC: 27 MMOL/L (ref 21–32)
CREAT SERPL-MCNC: 1.06 MG/DL (ref 0.7–1.3)
ECHO BSA: 2.46 M2
GLOBULIN SER CALC-MCNC: 3.1 G/DL (ref 2–4)
GLUCOSE SERPL-MCNC: 108 MG/DL (ref 65–100)
POTASSIUM SERPL-SCNC: 3.8 MMOL/L (ref 3.5–5.1)
PROT SERPL-MCNC: 6.3 G/DL (ref 6.4–8.2)
SODIUM SERPL-SCNC: 140 MMOL/L (ref 136–145)

## 2025-02-06 PROCEDURE — 6370000000 HC RX 637 (ALT 250 FOR IP): Performed by: INTERNAL MEDICINE

## 2025-02-06 PROCEDURE — C1769 GUIDE WIRE: HCPCS | Performed by: INTERNAL MEDICINE

## 2025-02-06 PROCEDURE — 93656 COMPRE EP EVAL ABLTJ ATR FIB: CPT | Performed by: INTERNAL MEDICINE

## 2025-02-06 PROCEDURE — 85347 COAGULATION TIME ACTIVATED: CPT

## 2025-02-06 PROCEDURE — 2580000003 HC RX 258: Performed by: NURSE ANESTHETIST, CERTIFIED REGISTERED

## 2025-02-06 PROCEDURE — 93325 DOPPLER ECHO COLOR FLOW MAPG: CPT

## 2025-02-06 PROCEDURE — 80053 COMPREHEN METABOLIC PANEL: CPT

## 2025-02-06 PROCEDURE — G0378 HOSPITAL OBSERVATION PER HR: HCPCS

## 2025-02-06 PROCEDURE — 2500000003 HC RX 250 WO HCPCS: Performed by: INTERNAL MEDICINE

## 2025-02-06 PROCEDURE — C1760 CLOSURE DEV, VASC: HCPCS | Performed by: INTERNAL MEDICINE

## 2025-02-06 PROCEDURE — 3700000000 HC ANESTHESIA ATTENDED CARE: Performed by: INTERNAL MEDICINE

## 2025-02-06 PROCEDURE — C1731 CATH, EP, 20 OR MORE ELEC: HCPCS | Performed by: INTERNAL MEDICINE

## 2025-02-06 PROCEDURE — 7100000000 HC PACU RECOVERY - FIRST 15 MIN: Performed by: INTERNAL MEDICINE

## 2025-02-06 PROCEDURE — 3700000001 HC ADD 15 MINUTES (ANESTHESIA): Performed by: INTERNAL MEDICINE

## 2025-02-06 PROCEDURE — 7100000001 HC PACU RECOVERY - ADDTL 15 MIN: Performed by: INTERNAL MEDICINE

## 2025-02-06 PROCEDURE — C1766 INTRO/SHEATH,STRBLE,NON-PEEL: HCPCS | Performed by: INTERNAL MEDICINE

## 2025-02-06 PROCEDURE — 36415 COLL VENOUS BLD VENIPUNCTURE: CPT

## 2025-02-06 PROCEDURE — 93657 TX L/R ATRIAL FIB ADDL: CPT | Performed by: INTERNAL MEDICINE

## 2025-02-06 PROCEDURE — 6360000002 HC RX W HCPCS: Performed by: NURSE ANESTHETIST, CERTIFIED REGISTERED

## 2025-02-06 PROCEDURE — C1733 CATH, EP, OTHR THAN COOL-TIP: HCPCS | Performed by: INTERNAL MEDICINE

## 2025-02-06 PROCEDURE — C1732 CATH, EP, DIAG/ABL, 3D/VECT: HCPCS | Performed by: INTERNAL MEDICINE

## 2025-02-06 PROCEDURE — 2709999900 HC NON-CHARGEABLE SUPPLY: Performed by: INTERNAL MEDICINE

## 2025-02-06 PROCEDURE — C1730 CATH, EP, 19 OR FEW ELECT: HCPCS | Performed by: INTERNAL MEDICINE

## 2025-02-06 PROCEDURE — C1894 INTRO/SHEATH, NON-LASER: HCPCS | Performed by: INTERNAL MEDICINE

## 2025-02-06 PROCEDURE — C1759 CATH, INTRA ECHOCARDIOGRAPHY: HCPCS | Performed by: INTERNAL MEDICINE

## 2025-02-06 PROCEDURE — 2720000010 HC SURG SUPPLY STERILE: Performed by: INTERNAL MEDICINE

## 2025-02-06 PROCEDURE — 93655 ICAR CATH ABLTJ DSCRT ARRHYT: CPT | Performed by: INTERNAL MEDICINE

## 2025-02-06 PROCEDURE — 2580000003 HC RX 258: Performed by: INTERNAL MEDICINE

## 2025-02-06 PROCEDURE — 76937 US GUIDE VASCULAR ACCESS: CPT | Performed by: INTERNAL MEDICINE

## 2025-02-06 PROCEDURE — 2500000003 HC RX 250 WO HCPCS: Performed by: NURSE ANESTHETIST, CERTIFIED REGISTERED

## 2025-02-06 RX ORDER — SUCCINYLCHOLINE CHLORIDE 20 MG/ML
INJECTION INTRAMUSCULAR; INTRAVENOUS
Status: DISCONTINUED | OUTPATIENT
Start: 2025-02-06 | End: 2025-02-06 | Stop reason: SDUPTHER

## 2025-02-06 RX ORDER — DEXTROSE MONOHYDRATE 100 MG/ML
INJECTION, SOLUTION INTRAVENOUS CONTINUOUS PRN
Status: DISCONTINUED | OUTPATIENT
Start: 2025-02-06 | End: 2025-02-06 | Stop reason: HOSPADM

## 2025-02-06 RX ORDER — SODIUM CHLORIDE 0.9 % (FLUSH) 0.9 %
5-40 SYRINGE (ML) INJECTION PRN
Status: DISCONTINUED | OUTPATIENT
Start: 2025-02-06 | End: 2025-02-06 | Stop reason: HOSPADM

## 2025-02-06 RX ORDER — DEXAMETHASONE SODIUM PHOSPHATE 4 MG/ML
INJECTION, SOLUTION INTRA-ARTICULAR; INTRALESIONAL; INTRAMUSCULAR; INTRAVENOUS; SOFT TISSUE
Status: DISCONTINUED | OUTPATIENT
Start: 2025-02-06 | End: 2025-02-06 | Stop reason: SDUPTHER

## 2025-02-06 RX ORDER — HEPARIN SODIUM 1000 [USP'U]/ML
INJECTION, SOLUTION INTRAVENOUS; SUBCUTANEOUS
Status: DISCONTINUED | OUTPATIENT
Start: 2025-02-06 | End: 2025-02-06 | Stop reason: SDUPTHER

## 2025-02-06 RX ORDER — FENTANYL CITRATE 50 UG/ML
25 INJECTION, SOLUTION INTRAMUSCULAR; INTRAVENOUS EVERY 5 MIN PRN
Status: DISCONTINUED | OUTPATIENT
Start: 2025-02-06 | End: 2025-02-06 | Stop reason: HOSPADM

## 2025-02-06 RX ORDER — PROTAMINE SULFATE 10 MG/ML
INJECTION, SOLUTION INTRAVENOUS
Status: DISCONTINUED | OUTPATIENT
Start: 2025-02-06 | End: 2025-02-06 | Stop reason: SDUPTHER

## 2025-02-06 RX ORDER — SODIUM CHLORIDE 9 MG/ML
INJECTION, SOLUTION INTRAVENOUS PRN
Status: DISCONTINUED | OUTPATIENT
Start: 2025-02-06 | End: 2025-02-07 | Stop reason: HOSPADM

## 2025-02-06 RX ORDER — PHENYLEPHRINE HCL IN 0.9% NACL 0.4MG/10ML
SYRINGE (ML) INTRAVENOUS
Status: DISCONTINUED | OUTPATIENT
Start: 2025-02-06 | End: 2025-02-06 | Stop reason: SDUPTHER

## 2025-02-06 RX ORDER — NALOXONE HYDROCHLORIDE 0.4 MG/ML
INJECTION, SOLUTION INTRAMUSCULAR; INTRAVENOUS; SUBCUTANEOUS PRN
Status: DISCONTINUED | OUTPATIENT
Start: 2025-02-06 | End: 2025-02-06 | Stop reason: HOSPADM

## 2025-02-06 RX ORDER — DEXMEDETOMIDINE HYDROCHLORIDE 100 UG/ML
INJECTION, SOLUTION INTRAVENOUS
Status: DISCONTINUED | OUTPATIENT
Start: 2025-02-06 | End: 2025-02-06 | Stop reason: SDUPTHER

## 2025-02-06 RX ORDER — SODIUM CHLORIDE 0.9 % (FLUSH) 0.9 %
5-40 SYRINGE (ML) INJECTION PRN
Status: DISCONTINUED | OUTPATIENT
Start: 2025-02-06 | End: 2025-02-07 | Stop reason: HOSPADM

## 2025-02-06 RX ORDER — SODIUM CHLORIDE, SODIUM LACTATE, POTASSIUM CHLORIDE, CALCIUM CHLORIDE 600; 310; 30; 20 MG/100ML; MG/100ML; MG/100ML; MG/100ML
INJECTION, SOLUTION INTRAVENOUS
Status: DISCONTINUED | OUTPATIENT
Start: 2025-02-06 | End: 2025-02-06 | Stop reason: SDUPTHER

## 2025-02-06 RX ORDER — SODIUM CHLORIDE 0.9 % (FLUSH) 0.9 %
5-40 SYRINGE (ML) INJECTION EVERY 12 HOURS SCHEDULED
Status: DISCONTINUED | OUTPATIENT
Start: 2025-02-06 | End: 2025-02-07 | Stop reason: HOSPADM

## 2025-02-06 RX ORDER — SODIUM CHLORIDE 9 MG/ML
INJECTION, SOLUTION INTRAVENOUS PRN
Status: DISCONTINUED | OUTPATIENT
Start: 2025-02-06 | End: 2025-02-06 | Stop reason: HOSPADM

## 2025-02-06 RX ORDER — ROCURONIUM BROMIDE 10 MG/ML
INJECTION, SOLUTION INTRAVENOUS
Status: DISCONTINUED | OUTPATIENT
Start: 2025-02-06 | End: 2025-02-06 | Stop reason: SDUPTHER

## 2025-02-06 RX ORDER — PROCHLORPERAZINE EDISYLATE 5 MG/ML
5 INJECTION INTRAMUSCULAR; INTRAVENOUS
Status: DISCONTINUED | OUTPATIENT
Start: 2025-02-06 | End: 2025-02-06 | Stop reason: HOSPADM

## 2025-02-06 RX ORDER — IPRATROPIUM BROMIDE AND ALBUTEROL SULFATE 2.5; .5 MG/3ML; MG/3ML
1 SOLUTION RESPIRATORY (INHALATION)
Status: DISCONTINUED | OUTPATIENT
Start: 2025-02-06 | End: 2025-02-06 | Stop reason: HOSPADM

## 2025-02-06 RX ORDER — HYDROMORPHONE HYDROCHLORIDE 1 MG/ML
0.5 INJECTION, SOLUTION INTRAMUSCULAR; INTRAVENOUS; SUBCUTANEOUS EVERY 5 MIN PRN
Status: DISCONTINUED | OUTPATIENT
Start: 2025-02-06 | End: 2025-02-06 | Stop reason: HOSPADM

## 2025-02-06 RX ORDER — ONDANSETRON 2 MG/ML
4 INJECTION INTRAMUSCULAR; INTRAVENOUS
Status: DISCONTINUED | OUTPATIENT
Start: 2025-02-06 | End: 2025-02-06 | Stop reason: HOSPADM

## 2025-02-06 RX ORDER — SODIUM CHLORIDE 0.9 % (FLUSH) 0.9 %
5-40 SYRINGE (ML) INJECTION EVERY 12 HOURS SCHEDULED
Status: DISCONTINUED | OUTPATIENT
Start: 2025-02-06 | End: 2025-02-06 | Stop reason: HOSPADM

## 2025-02-06 RX ORDER — ONDANSETRON 2 MG/ML
INJECTION INTRAMUSCULAR; INTRAVENOUS
Status: DISCONTINUED | OUTPATIENT
Start: 2025-02-06 | End: 2025-02-06 | Stop reason: SDUPTHER

## 2025-02-06 RX ORDER — GLUCAGON 1 MG/ML
1 KIT INJECTION PRN
Status: DISCONTINUED | OUTPATIENT
Start: 2025-02-06 | End: 2025-02-06 | Stop reason: HOSPADM

## 2025-02-06 RX ORDER — FENTANYL CITRATE 50 UG/ML
INJECTION, SOLUTION INTRAMUSCULAR; INTRAVENOUS
Status: DISCONTINUED | OUTPATIENT
Start: 2025-02-06 | End: 2025-02-06 | Stop reason: SDUPTHER

## 2025-02-06 RX ORDER — GLYCOPYRROLATE 0.2 MG/ML
INJECTION INTRAMUSCULAR; INTRAVENOUS
Status: DISCONTINUED | OUTPATIENT
Start: 2025-02-06 | End: 2025-02-06 | Stop reason: SDUPTHER

## 2025-02-06 RX ADMIN — SODIUM CHLORIDE, PRESERVATIVE FREE 10 ML: 5 INJECTION INTRAVENOUS at 21:00

## 2025-02-06 RX ADMIN — ONDANSETRON HYDROCHLORIDE 4 MG: 2 INJECTION, SOLUTION INTRAMUSCULAR; INTRAVENOUS at 16:20

## 2025-02-06 RX ADMIN — Medication 120 MCG: at 14:26

## 2025-02-06 RX ADMIN — Medication 450 MCG: at 14:28

## 2025-02-06 RX ADMIN — HEPARIN SODIUM 12000 UNITS: 1000 INJECTION, SOLUTION INTRAVENOUS; SUBCUTANEOUS at 14:38

## 2025-02-06 RX ADMIN — GLYCOPYRROLATE 0.4 MG: 0.2 INJECTION, SOLUTION INTRAMUSCULAR; INTRAVENOUS at 15:12

## 2025-02-06 RX ADMIN — ROCURONIUM BROMIDE 30 MG: 10 INJECTION INTRAVENOUS at 14:32

## 2025-02-06 RX ADMIN — SUCCINYLCHOLINE CHLORIDE 160 MG: 20 INJECTION, SOLUTION INTRAMUSCULAR; INTRAVENOUS at 14:17

## 2025-02-06 RX ADMIN — PROPOFOL 150 MG: 10 INJECTION, EMULSION INTRAVENOUS at 14:17

## 2025-02-06 RX ADMIN — METOPROLOL TARTRATE 50 MG: 50 TABLET, FILM COATED ORAL at 20:48

## 2025-02-06 RX ADMIN — PHENYLEPHRINE HYDROCHLORIDE 40 MCG/MIN: 10 INJECTION INTRAVENOUS at 14:20

## 2025-02-06 RX ADMIN — SODIUM CHLORIDE, POTASSIUM CHLORIDE, SODIUM LACTATE AND CALCIUM CHLORIDE: 600; 310; 30; 20 INJECTION, SOLUTION INTRAVENOUS at 14:17

## 2025-02-06 RX ADMIN — FENTANYL CITRATE 50 MCG: 50 INJECTION, SOLUTION INTRAMUSCULAR; INTRAVENOUS at 15:50

## 2025-02-06 RX ADMIN — DILTIAZEM HYDROCHLORIDE 120 MG: 120 CAPSULE, EXTENDED RELEASE ORAL at 07:28

## 2025-02-06 RX ADMIN — PROTAMINE SULFATE 70 MG: 10 INJECTION, SOLUTION INTRAVENOUS at 15:49

## 2025-02-06 RX ADMIN — ROCURONIUM BROMIDE 10 MG: 10 INJECTION INTRAVENOUS at 14:17

## 2025-02-06 RX ADMIN — SODIUM CHLORIDE, PRESERVATIVE FREE 10 ML: 5 INJECTION INTRAVENOUS at 12:47

## 2025-02-06 RX ADMIN — SUGAMMADEX 200 MG: 100 INJECTION, SOLUTION INTRAVENOUS at 16:20

## 2025-02-06 RX ADMIN — DEXMEDETOMIDINE 10 MCG: 100 INJECTION, SOLUTION INTRAVENOUS at 16:36

## 2025-02-06 RX ADMIN — FENTANYL CITRATE 50 MCG: 50 INJECTION, SOLUTION INTRAMUSCULAR; INTRAVENOUS at 14:07

## 2025-02-06 RX ADMIN — Medication 100 MCG: at 14:17

## 2025-02-06 RX ADMIN — Medication 200 MCG: at 14:23

## 2025-02-06 RX ADMIN — POTASSIUM CHLORIDE 10 MEQ: 750 TABLET, EXTENDED RELEASE ORAL at 20:48

## 2025-02-06 RX ADMIN — APIXABAN 5 MG: 5 TABLET, FILM COATED ORAL at 20:48

## 2025-02-06 RX ADMIN — LIDOCAINE HYDROCHLORIDE 100 MG: 20 INJECTION, SOLUTION EPIDURAL; INFILTRATION; INTRACAUDAL; PERINEURAL at 14:17

## 2025-02-06 RX ADMIN — DEXAMETHASONE SODIUM PHOSPHATE 4 MG: 4 INJECTION, SOLUTION INTRAMUSCULAR; INTRAVENOUS at 16:20

## 2025-02-06 RX ADMIN — METOPROLOL TARTRATE 50 MG: 50 TABLET, FILM COATED ORAL at 07:00

## 2025-02-06 NOTE — ANESTHESIA PRE PROCEDURE
Department of Anesthesiology  Preprocedure Note       Name:  Tobin Mosher   Age:  64 y.o.  :  1960                                          MRN:  [id number]         Date:  2025      Surgeon: Surgeon(s):  Jaskaran Lawrence MD    Procedure: Procedure(s):  Ablation A-fib w complete ep study    Medications prior to admission:   Prior to Admission medications    Medication Sig Start Date End Date Taking? Authorizing Provider   finasteride (PROSCAR) 5 MG tablet Take 1 tablet by mouth daily    Provider, MD Flo   diclofenac (VOLTAREN) 75 MG EC tablet TAKE 1 TABLET BY MOUTH TWICE A DAY WITH FOOD FOR PAIN AND INFLAMMATION    Provider, MD Flo   allopurinol (ZYLOPRIM) 100 MG tablet Take by mouth daily    Automatic Reconciliation, Ar   aspirin 325 MG tablet Take 325 mg by mouth daily 22   Automatic Reconciliation, Ar   dilTIAZem (TIAZAC) 120 MG extended release capsule Take 120 mg by mouth daily 22   Automatic Reconciliation, Ar   metoprolol tartrate (LOPRESSOR) 50 MG tablet Take 50 mg by mouth in the morning and 50 mg in the evening. 22   Automatic Reconciliation, Ar   potassium citrate (UROCIT-K) 10 MEQ (1080 MG) extended release tablet Take 10 mEq by mouth 2 times daily    Automatic Reconciliation, Ar   tamsulosin (FLOMAX) 0.4 MG capsule Take 0.4 mg by mouth daily    Automatic Reconciliation, Ar       Current medications:    Current Facility-Administered Medications   Medication Dose Route Frequency Provider Last Rate Last Admin   • dilTIAZem (CARDIZEM CD) extended release capsule 120 mg  120 mg Oral Daily Chris Damon MD   120 mg at 25 0728   • allopurinol (ZYLOPRIM) tablet 100 mg  100 mg Oral Daily Chris Damon MD       • [Held by provider] aspirin tablet 325 mg  325 mg Oral Daily Chris Damon MD       • finasteride (PROSCAR) tablet 5 mg  5 mg Oral Daily Chris Damon MD       • tamsulosin (FLOMAX) capsule 0.4 mg  0.4 mg Oral Daily Chris Damon MD

## 2025-02-06 NOTE — ED NOTES
Patient awaiting transport to inpatient room.  Brief report given to [facility] and introduced to patient.  Pt. With no complaints.

## 2025-02-06 NOTE — PROGRESS NOTES
1540: Verbal report received from [facility] RN.     1730: TRANSFER - IN REPORT:  Verbal report received from Pacu,RN on Tobin Mosher  being received from [facility] for routine progression of care. Report consisted of patient’s Situation, Background, Assessment and Recommendations(SBAR).  Opportunity for questions and clarification was provided. Assessment completed upon patient’s arrival to IVCU and care assumed.       PROCEDURE SITE CHECK:    Procedure site: BI groin. Patient currently has no c/o pain/discomfort reported at procedure site.

## 2025-02-06 NOTE — PROGRESS NOTES
Status post PULMONARY VEIN ISOLATION and posterior wall electrical isolation for persistent atrial fibrillation.  Out of atrial fibrillation, had to pace-terminate a short RP relationship SVT to finish his atrial fibrillation ablation.  E study was performed and I could NOT induce SVT.  Isuprel used and I could not induce.  Maybe a PV tachycardia with a reentrant circuit.

## 2025-02-06 NOTE — PROGRESS NOTES
Pt AOX, VSS. Pt noted in Afib RVR this morning, ordered PO metoprolol and diltiazem given as ordered. Pt has order for TRANG and ablation. Pt off unit to cath lab. Pt to transfer to IVCU Rm 2216. Report given to NGOC Valladares.

## 2025-02-06 NOTE — PROGRESS NOTES
Hospitalist Progress Note    NAME:   Tobin Mosher   : 1960   MRN: [id number]     Date/Time: 2025 1:54 PM  Patient PCP: Ramiro Bautista MD    Estimated discharge date:      Assessment / Plan:  Atrial fibrillation with rapid ventricular response   Secondary hypercoagulable state secondary to A-fib  HTN  Cont' BB and cardizem  Plan for TRANG and DCCV  ASA discontinued, con't Eliquis per cardiology's rec     Morbid obesity  BMI greater than 35  Counseled on dieting and exercise     Obstructive sleep apnea  Noncompliant with home CPAP  Encourage patient on the importance of CPAP use  High chances of pulmonary hypertension, heart disease and sudden cardiac death without use of CPAP           Medical Decision Making:   I personally reviewed labs  I personally reviewed imaging  Toxic drug monitoring  I personally reviewed EKG  Discussed case with: RN, discussed plan of care and dispo during round        Code Status: Full  DVT Prophylaxis: eliquis    Subjective:   Pt is anxious about procedure.  No palpitations this AM.        Discussed with RN events overnight.       Objective:     VITALS:   Last 24hrs VS reviewed since prior progress note. Most recent are:  Patient Vitals for the past 24 hrs:   BP Temp Temp src Pulse Resp SpO2   25 0728 114/88 -- -- (!) 102 -- --   25 0703 (!) 161/133 97.9 °F (36.6 °C) Oral (!) 101 16 95 %   25 0700 (!) 161/133 -- -- (!) 118 -- --   25 0233 120/87 97.7 °F (36.5 °C) -- 84 -- --   25 2030 96/85 97.7 °F (36.5 °C) Oral (!) 103 18 95 %   25 1900 113/69 -- -- 96 -- 95 %   25 1800 108/73 97.6 °F (36.4 °C) Axillary 85 16 94 %   25 1700 113/71 -- -- (!) 104 18 94 %   25 1630 125/73 -- -- (!) 102 21 94 %   25 1415 (!) 119/56 -- -- (!) 101 18 96 %       No intake or output data in the 24 hours ending 25 1376     I had a face to face encounter and independently examined this patient on 2025, as outlined  2/6/2025, as outlined below:  PHYSICAL EXAM:  General: Alert, cooperative   EENT:  EOMI. Anicteric sclerae. MMM  Resp:  CTA bilaterally, no wheezing or rales.  No accessory muscle use  CV:  tachycardia,  No edema  GI:  Soft, Non distended, Non tender.  +BS  Neurologic:  Alert and oriented X 3, normal speech,   Psych:   Good insight. Not anxious nor agitated  Skin:  No rashes.  No jaundice    Reviewed most current lab test results and cultures  YES  Reviewed most current radiology test results   YES  Review and summation of old records today    NO  Reviewed patient's current orders and MAR    YES  PMH/SH reviewed - no change compared to H&P      Procedures: see electronic medical records for all procedures/Xrays and details which were not copied into this note but were reviewed prior to creation of Plan.      LABS:  I reviewed today's most current labs and imaging studies.  Pertinent labs include:  Recent Labs     02/05/25  1014   WBC 8.2   HGB 17.4*   HCT 51.1*        Recent Labs     02/05/25  1014 02/06/25  0303    140   K 4.4 3.8    109*   CO2 25 27   GLUCOSE 118* 108*   BUN 25* 24*   CREATININE 1.13 1.06   CALCIUM 9.2 8.3*   MG 2.4  --    BILITOT 0.6 0.4   AST 27 22   ALT 50 49       Signed: Flavia Demarco MD

## 2025-02-06 NOTE — PROGRESS NOTES
End of Shift Note    Bedside shift change report given to NGOC Godinez (oncoming nurse) by Lata Guzman RN (offgoing nurse).  Report included the following information SBAR, Kardex, MAR, Recent Results, and Cardiac Rhythm A Fib    Shift worked:  7p-7a     Shift summary and any significant changes:     Patient tolerated care. Pt continues to be in A fib. In the morning his HR fluctuated between 120'-150's. Gave 0900 metoprolol at 7am. Messaged provider, made day shift nurse aware. Wife at bedside. CHG bath. Caring rounds provided.     Concerns for physician to address:       Zone phone for oncoming shift:          Activity:  Level of Assistance: Independent  Number times ambulated in hallways past shift: 0  Number of times OOB to chair past shift: 3    Cardiac:   Cardiac Monitoring: Yes      Cardiac Rhythm: Atrial fib    Access:  Current line(s): PIV     Genitourinary:        Respiratory:   O2 Device: None (Room air)  Chronic home O2 use?: NO  Incentive spirometer at bedside: NO    GI:     Current diet:  Diet NPO  Passing flatus: YES    Pain Management:   Patient states pain is manageable on current regimen: YES    Skin:  Brittno Scale Score: 22  Interventions:      Patient Safety:  Fall Risk:    Fall Risk Interventions  Toilet Every 2 Hours-In Advance of Need: No (Comment)  Hourly Visual Checks: In bed, Awake  Fall Visual Posted: Fall sign posted, Socks  Room Door Open: Deferred to promote rest  Alarm On: Other (Comment)  Patient Moved Closer to Nursing Station: No    Active Consults:   IP CONSULT TO CARDIOLOGY    Length of Stay:  Expected LOS: 2  Actual LOS: 1    Lata Guzman RN

## 2025-02-07 VITALS
HEIGHT: 72 IN | HEART RATE: 96 BPM | OXYGEN SATURATION: 94 % | DIASTOLIC BLOOD PRESSURE: 79 MMHG | TEMPERATURE: 97.9 F | BODY MASS INDEX: 35.49 KG/M2 | WEIGHT: 262 LBS | RESPIRATION RATE: 18 BRPM | SYSTOLIC BLOOD PRESSURE: 123 MMHG

## 2025-02-07 LAB
ANION GAP SERPL CALC-SCNC: 6 MMOL/L (ref 2–12)
BASOPHILS # BLD: 0.02 K/UL (ref 0–0.1)
BASOPHILS NFR BLD: 0.2 % (ref 0–1)
BUN SERPL-MCNC: 28 MG/DL (ref 6–20)
BUN/CREAT SERPL: 23 (ref 12–20)
CALCIUM SERPL-MCNC: 8.7 MG/DL (ref 8.5–10.1)
CHLORIDE SERPL-SCNC: 108 MMOL/L (ref 97–108)
CO2 SERPL-SCNC: 24 MMOL/L (ref 21–32)
CREAT SERPL-MCNC: 1.21 MG/DL (ref 0.7–1.3)
DIFFERENTIAL METHOD BLD: ABNORMAL
EOSINOPHIL # BLD: 0 K/UL (ref 0–0.4)
EOSINOPHIL NFR BLD: 0 % (ref 0–7)
ERYTHROCYTE [DISTWIDTH] IN BLOOD BY AUTOMATED COUNT: 13.8 % (ref 11.5–14.5)
GLUCOSE SERPL-MCNC: 133 MG/DL (ref 65–100)
HCT VFR BLD AUTO: 45.3 % (ref 36.6–50.3)
HGB BLD-MCNC: 15.2 G/DL (ref 12.1–17)
IMM GRANULOCYTES # BLD AUTO: 0.06 K/UL (ref 0–0.04)
IMM GRANULOCYTES NFR BLD AUTO: 0.5 % (ref 0–0.5)
LYMPHOCYTES # BLD: 1.03 K/UL (ref 0.8–3.5)
LYMPHOCYTES NFR BLD: 8.3 % (ref 12–49)
MCH RBC QN AUTO: 28.1 PG (ref 26–34)
MCHC RBC AUTO-ENTMCNC: 33.6 G/DL (ref 30–36.5)
MCV RBC AUTO: 83.7 FL (ref 80–99)
MONOCYTES # BLD: 0.49 K/UL (ref 0–1)
MONOCYTES NFR BLD: 4 % (ref 5–13)
NEUTS SEG # BLD: 10.75 K/UL (ref 1.8–8)
NEUTS SEG NFR BLD: 87 % (ref 32–75)
NRBC # BLD: 0 K/UL (ref 0–0.01)
NRBC BLD-RTO: 0 PER 100 WBC
PLATELET # BLD AUTO: 246 K/UL (ref 150–400)
PMV BLD AUTO: 10.2 FL (ref 8.9–12.9)
POTASSIUM SERPL-SCNC: 4.5 MMOL/L (ref 3.5–5.1)
RBC # BLD AUTO: 5.41 M/UL (ref 4.1–5.7)
SODIUM SERPL-SCNC: 138 MMOL/L (ref 136–145)
WBC # BLD AUTO: 12.4 K/UL (ref 4.1–11.1)

## 2025-02-07 PROCEDURE — 36415 COLL VENOUS BLD VENIPUNCTURE: CPT

## 2025-02-07 PROCEDURE — 85025 COMPLETE CBC W/AUTO DIFF WBC: CPT

## 2025-02-07 PROCEDURE — G0378 HOSPITAL OBSERVATION PER HR: HCPCS

## 2025-02-07 PROCEDURE — 80048 BASIC METABOLIC PNL TOTAL CA: CPT

## 2025-02-07 NOTE — ANESTHESIA POSTPROCEDURE EVALUATION
Department of Anesthesiology  Postprocedure Note    Patient: Tobin Mosher  MRN: [id number]  YOB: 1960  Date of evaluation: 2/6/2025    Procedure Summary       Date: 02/06/25 Room / Location: [facility] EP LAB 2 / [facility] CARDIAC CATH LAB    Anesthesia Start: 1405 Anesthesia Stop: 1649    Procedures:       Ablation A-fib w complete ep study      Ep cardioversion      Ablation following A-fib addl Diagnosis: A-fib (HCC)    Providers: Jaskaran Lawrence MD Responsible Provider: Gennaro Choudhary MD    Anesthesia Type: General ASA Status: 3            Anesthesia Type: General    Mala Phase I: Mala Score: 9    Mala Phase II:      Anesthesia Post Evaluation    Patient location during evaluation: PACU  Patient participation: complete - patient participated  Level of consciousness: awake and alert  Airway patency: patent  Nausea & Vomiting: no nausea and no vomiting  Cardiovascular status: hemodynamically stable  Respiratory status: acceptable  Hydration status: stable    No notable events documented.

## 2025-02-07 NOTE — PLAN OF CARE
Problem: Discharge Planning  Goal: Discharge to home or other facility with appropriate resources  2/6/2025 2359 by Kenn Wheeler, RN  Outcome: Progressing  2/6/2025 1927 by Yasmeen Villalobos RN  Outcome: Progressing  2/6/2025 1538 by Gretchen Velazquez RN  Outcome: Progressing     Problem: Pain  Goal: Verbalizes/displays adequate comfort level or baseline comfort level  2/6/2025 2359 by Kenn Wheeler RN  Outcome: Progressing  2/6/2025 1927 by Yasmeen Vlilalobos, RN  Outcome: Progressing     Problem: Safety - Adult  Goal: Free from fall injury  2/6/2025 2359 by Kenn Wheeler RN  Outcome: Progressing  2/6/2025 1927 by Yasmeen Villalobos, RN  Outcome: Progressing

## 2025-02-07 NOTE — PROGRESS NOTES
Bedside and Verbal shift change report given to NGOC Arana (oncoming nurse) by NGOC Wilkinson (offgoing nurse). Report included the following information Nurse Handoff Report, Surgery Report, Intake/Output, Recent Results, and Cardiac Rhythm SR .     0950:I have reviewed Discharge Instructions with the patient. The patient verbalized understanding. Discharge medications reviewed with patient along with appropriate educational materials. Opportunity for questions and clarification was provided. All lines removed without difficulty. Cath sites are clean, dry, and intact. Educated on the importance of taking medications as prescribed. Patient's belongings gathered and with patient. Patient is ready for discharge.

## 2025-02-07 NOTE — DISCHARGE SUMMARY
Discharge Summary    Date: 2/7/2025  Patient Name: Tobin Mosher    YOB: 1960     Age: 64 y.o.    Admit Date: 2/5/2025  Discharge Date: 2/7/2025  Discharge Condition: Good    Admission Diagnosis  Paroxysmal atrial fibrillation (HCC) [I48.0];Atrial fibrillation with rapid ventricular response (HCC) [I48.91];Atrial fibrillation with RVR (HCC) [I48.91]      Discharge Diagnosis  Principal Problem:    Atrial fibrillation with rapid ventricular response (HCC)  Active Problems:    Atrial fibrillation with RVR (HCC)  Resolved Problems:    * No resolved hospital problems. *      Hospital Stay  Narrative of Hospital Course:  Patient presented to the [facility] ED with symptoms of fast heart rate and fatigue and shortness of breath. He was noted to be in Afib with RVR. He had taken an extra 25 mg of Metoprolol that AM in addition to his 50 mg daily dosage, but continued to feel unwell.   He responded to Diltiazem 10mg IV and was offered Afib ablation the next morning. He is now POD#1 from Afib ablation and is doing well and in NSR.    Consultants:  IP CONSULT TO CARDIOLOGY    Surgeries/procedures Performed:  Afib ablation     Treatments:            Discharge Plan/Disposition:  Home    Hospital/Incidental Findings Requiring Follow Up:    Patient Instructions:    Diet:    Activity:No Lifting, Driving or Strenuous Excercise  For number of days (if applicable):      Other Instructions:    Provider Follow-Up:   No follow-ups on file.     Significant Diagnostic Studies:    Recent Labs:  Admission on 02/05/2025, Discharged on 02/07/2025  WBC                                           Date: 02/05/2025  Value: 8.2         Ref range: 4.1 - 11.1 K/uL    Status: Final  RBC                                           Date: 02/05/2025  Value: 6.19 (H)    Ref range: 4.10 - 5.70 M/uL   Status: Final  Hemoglobin                                    Date: 02/05/2025  Value: 17.4 (H)    Ref range: 12.1 - 17.0 g/dL   Status: Final  Hematocrit

## 2025-02-07 NOTE — PLAN OF CARE
Problem: Discharge Planning  Goal: Discharge to home or other facility with appropriate resources  2/7/2025 0912 by Yasmeen Villalobos RN  Outcome: Adequate for Discharge  2/6/2025 2359 by Kenn Wheeler RN  Outcome: Progressing  2/6/2025 1927 by Yasmeen Villalobos RN  Outcome: Progressing     Problem: Pain  Goal: Verbalizes/displays adequate comfort level or baseline comfort level  2/7/2025 0912 by Yasmeen Villalobos RN  Outcome: Adequate for Discharge  2/6/2025 2359 by Kenn Wheeler RN  Outcome: Progressing  2/6/2025 1927 by Yasmeen Villalobos RN  Outcome: Progressing     Problem: Safety - Adult  Goal: Free from fall injury  2/7/2025 0912 by Yasmeen Villalobos RN  Outcome: Adequate for Discharge  2/6/2025 2359 by Kenn Wheeler RN  Outcome: Progressing  2/6/2025 1927 by Yasmeen Villalobos RN  Outcome: Progressing

## 2025-02-07 NOTE — CARE COORDINATION
Care Management Initial Assessment       RUR: 9% \"low risk\"  Readmission? No  1st IM letter given? N/A   1st  letter given: N/A    Initial note - 0919 AM: Chart reviewed. CM met with pt at the bedside to introduce self and role. Verified contact information and demographics. Pt resides with pt spouse in a one level home with 3 ANJELICA. Pt PCP is [facility] with the last visit being within the last three months. Preferred pharmacy is [facility]. Pt has no hx of  services or a SNF stay. Pt is independent with ADL's and has no DME needs. Pt is an active . Pt has no ACP on file; pt is a FULL code. Pt spouse to transport pt home upon time of d/c. Full assessment below:     02/07/25 0919   Service Assessment   Patient Orientation Alert and Oriented;Person;Self;Place;Situation   Cognition Alert   History Provided By Patient   Primary Caregiver Self   Support Systems Spouse/Significant Other   Patient's Healthcare Decision Maker is: Legal Next of Kin   PCP Verified by CM Yes  ([facility])   Last Visit to PCP Within last 3 months   Prior Functional Level Independent in ADLs/IADLs   Current Functional Level Independent in ADLs/IADLs   Can patient return to prior living arrangement Yes   Ability to make needs known: Good   Family able to assist with home care needs: Yes   Would you like for me to discuss the discharge plan with any other family members/significant others, and if so, who? Yes  (Gisell Mosher (spouse))   Financial Resources Other (Comment)  ([employer])   Community Resources None   Social/Functional History   Lives With Spouse   Type of Home House   Home Layout One level   Home Access Stairs to enter with rails   Entrance Stairs - Number of Steps 3 ANJELICA   Home Equipment None   Receives Help From Family   Prior Level of Assist for ADLs Independent   Prior Level of Assist for Homemaking Independent   Ambulation Assistance Independent   Prior Level of Assist for

## 2025-02-07 NOTE — PLAN OF CARE
Problem: Discharge Planning  Goal: Discharge to home or other facility with appropriate resources  2/6/2025 1927 by Yasmeen Villlaobos, RN  Outcome: Progressing  2/6/2025 1538 by Gretchen Velazquez, RN  Outcome: Progressing     Problem: Pain  Goal: Verbalizes/displays adequate comfort level or baseline comfort level  Outcome: Progressing     Problem: Safety - Adult  Goal: Free from fall injury  Outcome: Progressing

## 2025-02-07 NOTE — PROGRESS NOTES
2030: Patient ambulated from the bed to the bathroom. Patient tolerated ambulation well. Bilateral groin sites are CDI with QuikClot and Tegaderm. No signs of bleeding or hematoma.     End of Shift Note    Bedside shift change report given to NGOC Rene (oncoming nurse) by Kenn Wheeler RN (offgoing nurse).  Report included the following information SBAR, Intake/Output, MAR, Recent Results, Cardiac Rhythm Sinus Rhythm, Quality Measures, and Dual Neuro Assessment    Shift worked:  7:00 PM-7:30 AM     Shift summary and any significant changes:    Patient ambulated once bed rest was over. Patient tolerated ambulation very well. Patient was anxious about the possibility of groin sites bleeding. I educated the patient on the proper precautions to take when going back and forth from the bed to the bathroom. Patient's vital signs were stable throughout the shift. Patient's labs were drawn and resulted. Patient's WBC was 12.4. Patient's BUN was 28.   Concerns for physician to address:  Discharge      Zone phone for oncoming shift:   N/A       Activity:  Level of Assistance: Minimal assist, patient does 75% or more  Number times ambulated in hallways past shift: 0  Number of times OOB to chair past shift: 5+    Cardiac:   Cardiac Monitoring: Yes      Cardiac Rhythm: Sinus rhythm    Access:  Current line(s): PIV     Genitourinary:   Urinary Status: Voiding, Bathroom privileges    Respiratory:   O2 Device: None (Room air)  Chronic home O2 use?: NO  Incentive spirometer at bedside: NO    GI:     Current diet:  ADULT DIET; Regular; Low Fat/Low Chol/High Fiber/2 gm Na  Passing flatus: YES    Pain Management:   Patient states pain is manageable on current regimen: YES    Skin:  Britton Scale Score: 21  Interventions: Wound Offloading (Prevention Methods): Bed, pressure redistribution/air, Bed, pressure reduction mattress, Elevate heels, Pillows, Repositioning, Turning    Patient Safety:  Fall Risk: Nursing Judgement-Fall Risk

## 2025-02-07 NOTE — DISCHARGE INSTRUCTIONS
POST-EP STUDY AND/OR ABLATION DISCHARGE INSTRUCTIONS:      Do not drive, operate any machinery, or sign any legal documents for 24 hours after your procedure.  You must have someone to drive you home.    You may take a shower 24 hours after your cardiac procedure.  Be sure to get the dressing wet and then remove it; gently wash the area with warm soapy water.  Pat dry and leave open to air.  To help prevent infections, be sure to keep the cath site clean and dry.  No lotions, creams, powders, ointments, etc. in the cath site for approximately 1 week.    Do not take a tub bath, get in a hot tub or swimming pool for approximately 5 days or until the cath site is completely healed.      No strenuous activity or heavy lifting over 20 lbs. for 7 days.     After your procedure, some bruising or discomfort is common during the healing process.  Tylenol, 1-2 tablets every 6 hours as needed, is recommended if you experience any discomfort.  If you experience any signs or symptoms of infection such as fever, chills, or poorly healing incision, persistent tenderness or swelling in the groin, redness and/or warmth to the touch, numbness, significant tingling or pain at the groin site or affected extremity, rash, drainage from the site, or if the leg feels tight or swollen, call your physician right away.    If bleeding at the site occurs, take a clean gauze pad and apply direct pressure to the groin just above the puncture site, and call your physician right away.    If your procedure involved ablation therapy, you may feel some mild or vague chest discomfort due to delivery of heat therapy to the heart muscle.  This should resolve in 1-2 days.  If it gets worse or is associated with shortness of breath, dizziness, loss of consciousness, call your physician right away or call 911 if emergency medical care is needed.

## 2025-02-07 NOTE — DISCHARGE SUMMARY
Discharge Summary    Name: Tobin Mosher  [id number]  YOB: 1960 (Age: 64 y.o.)   Date of Admission: 2/5/2025  Date of Discharge: 2/7/2025  Attending Physician: Evy att. providers found    Discharge Diagnosis:     Atrial fibrillation with rapid ventricular response status post pulmonary vein isolation  Secondary hypercoagulable state secondary to A-fib  HTN  Morbid obesity  Obstructive sleep apnea  Noncompliant with home CPAP    Consultations:  IP CONSULT TO CARDIOLOGY      Brief Admission History/Reason for Admission Per Chris Damon MD:   64-year-old gentleman with history of paroxysmal atrial fibrillation not on anticoagulation due to prior  bleed, obstructive sleep apnea noncompliant with home CPAP, essential hypertension and obesity presents to the ED with complaints of chest palpitations.  Symptoms ongoing since last Saturday.  Evaluation in the ED revealed that patient was in A-fib with RVR.  Responded to IV Cardizem push.  Seen and evaluated by cardiologist with plans of electrical cardioversion in a.m.  He reports a history of hematuria while on apixaban.  He has been on aspirin alone at home.  No chest pain or shortness of breath.       Brief Hospital Course by Main Problems:     Patient was admitted hospital noted to have atrial fibrillation with rapid ventricular fluids cardiology was consulted.  Cardiology did a pulmonary vein isolation and also posterior wall electrical isolation for persistent atrial fibrillation.  Cardiology started on Eliquis and cleared the patient for discharge.  Patient's rest of the medical problems remained stable during hospitalization.  Case management was consulted to find out the cost of Eliquis and it is about $40 a month.    Patient seen and examined today, vitals are stable, lab work is at baseline and is being released in much improved condition for outpatient follow-up.  Patient noted to have leukocytosis of 12.4 which  most likely is related to cardiac procedure yesterday and was advised to have repeat CBC in 1 week.  He is currently afebrile.      Discharge Exam:  Patient seen and examined by me on discharge day.  Pertinent Findings:  Patient Vitals for the past 24 hrs:   BP Temp Temp src Pulse Resp SpO2   02/07/25 0720 123/79 97.9 °F (36.6 °C) Oral 96 18 94 %   02/07/25 0345 104/68 97.9 °F (36.6 °C) Oral 88 20 93 %   02/06/25 2320 109/74 98.1 °F (36.7 °C) Oral 86 16 93 %   02/06/25 2048 121/72 98 °F (36.7 °C) Oral (!) 105 22 92 %   02/06/25 1830 (!) 128/98 -- -- 92 21 (!) 89 %   02/06/25 1815 121/71 -- -- 98 22 90 %   02/06/25 1800 125/77 -- -- 90 20 (!) 89 %   02/06/25 1745 138/86 -- -- 90 19 91 %   02/06/25 1735 104/81 -- -- 92 20 92 %   02/06/25 1730 104/81 97.9 °F (36.6 °C) Oral 85 18 93 %   02/06/25 1715 118/79 97.6 °F (36.4 °C) Oral 91 20 92 %   02/06/25 1705 104/79 -- -- 88 18 91 %   02/06/25 1700 111/78 -- -- 90 19 94 %   02/06/25 1655 111/76 -- -- 87 20 96 %   02/06/25 1650 101/69 -- -- 97 23 91 %   02/06/25 1647 102/71 97.5 °F (36.4 °C) -- 100 22 91 %       Gen:    Not in distress  Chest: Clear lungs  CVS:   Regular rhythm.  No edema  Abd:  Soft, not distended, not tender  Neuro: awake, moving all exts    Discharge/Recent Laboratory Results:  Recent Labs     02/05/25  1014 02/06/25  0303 02/07/25  0413      < > 138   K 4.4   < > 4.5      < > 108   CO2 25   < > 24   BUN 25*   < > 28*   CREATININE 1.13   < > 1.21   GLUCOSE 118*   < > 133*   CALCIUM 9.2   < > 8.7   MG 2.4  --   --     < > = values in this interval not displayed.     Recent Labs     02/07/25  0413   HGB 15.2   HCT 45.3   WBC 12.4*          Discharge Medications:     Medication List        START taking these medications      apixaban 5 MG Tabs tablet  Commonly known as: ELIQUIS  Take 1 tablet by mouth 2 times daily            CONTINUE taking these medications      allopurinol 100 MG tablet  Commonly known as: ZYLOPRIM     dilTIAZem 120 MG

## 2025-02-09 LAB
EKG DIAGNOSIS: NORMAL
EKG Q-T INTERVAL: 306 MS
EKG QRS DURATION: 80 MS
EKG QTC CALCULATION (BAZETT): 443 MS
EKG R AXIS: 50 DEGREES
EKG T AXIS: -3 DEGREES
EKG VENTRICULAR RATE: 126 BPM

## 2025-02-11 LAB — ECHO BSA: 2.46 M2

## 2025-02-15 LAB
ECHO BSA: 2.46 M2
ECHO LV EF PHYSICIAN: 60 %

## (undated) DEVICE — DRESSING HEMOSTATIC INTVENT W/O SLT QUIKCLOT

## (undated) DEVICE — ELECTRODE PT RET AD L9FT HI MOIST COND ADH HYDRGEL CORDED

## (undated) DEVICE — CATHETER ABLAT 8FR L115CM 1-6-2MM SPC TIP 3.5MM FJ CRV

## (undated) DEVICE — CABLE REPROC CATH CONN 150 CM EXTN EP DIAG BLK

## (undated) DEVICE — CATHETER REPROC 6FR QUADRIPOL JSN 120CM SUPREME

## (undated) DEVICE — PATCH REF EXT FOR CARTO 3 SYS (EA = 6 PACKS)

## (undated) DEVICE — PINNACLE INTRODUCER SHEATH: Brand: PINNACLE

## (undated) DEVICE — CATHETER MAP D CRV 3-3-3-3-3 MM SPC GALAXY OCTARAY

## (undated) DEVICE — 1 X VERSACROSS CONNECT TRANSSEPTAL DILATOR;  1 X VERSACROSS RF WIRE (INCLUDING 1 X CONNECTOR CABLE (SINGLE USE)): Brand: VERSACROSS CONNECT ACCESS SOLUTION FOR FARADRIVE

## (undated) DEVICE — PULSED FIELD ABLATION CATHETER: Brand: FARAWAVE™

## (undated) DEVICE — CABLE EP L150CM RED CONNECTS W/ 4FR SUPREME BPLR QPLR CATH

## (undated) DEVICE — CABLE REPROC EP DIAG EXT RED

## (undated) DEVICE — STEERABLE SHEATH CLEAR: Brand: FARADRIVE™

## (undated) DEVICE — CATHETER EP LG 2-8-2 MM 7 FRX95 CM LIVEWIRE

## (undated) DEVICE — PRESSURE MONITORING SET: Brand: TRUWAVE

## (undated) DEVICE — PERCLOSE™ PROSTYLE™ SUTURE-MEDIATED CLOSURE AND REPAIR SYSTEM: Brand: PERCLOSE™ PROSTYLE™

## (undated) DEVICE — GUIDEWIRE VASC L180CM 0035IN 15MM J PTFE ROSEN TIP CRV FIX

## (undated) DEVICE — TUBING PMP FOR CARTO SYS SMARTABLATE

## (undated) DEVICE — CABLE REPROC INTERFACE CARTO 3

## (undated) DEVICE — HEART CATH-MRMC: Brand: MEDLINE INDUSTRIES, INC.

## (undated) DEVICE — CATHETER CONNECTION CABLE: Brand: FARASTAR™

## (undated) DEVICE — STERILE (15.2 TAPERED TO 7.6 X 183CM) POLYETHYLENE ACCORDION-FOLDED COVER FOR USE WITH SIEMENS ACUNAV ULTRASOUND CATHETER FAMILY CONNECTOR: Brand: SWIFTLINK TRANSDUCER COVER

## (undated) DEVICE — MEDI-TRACE CADENCE ADULT, DEFIBRILLATION ELECTRODE -RTS  (10 PR/PK) - PHYSIO-CONTROL: Brand: MEDI-TRACE CADENCE

## (undated) DEVICE — CATHETER REPROC SNDSTR ECO 3D DGNSTC ULTRSND USE SMNS IMGNG SSTM 10

## (undated) DEVICE — PROVE COVER: Brand: UNBRANDED